# Patient Record
Sex: FEMALE | Race: WHITE | NOT HISPANIC OR LATINO | Employment: FULL TIME | RURAL
[De-identification: names, ages, dates, MRNs, and addresses within clinical notes are randomized per-mention and may not be internally consistent; named-entity substitution may affect disease eponyms.]

---

## 2017-02-22 ENCOUNTER — HISTORICAL (OUTPATIENT)
Dept: ADMINISTRATIVE | Facility: HOSPITAL | Age: 57
End: 2017-02-22

## 2017-02-23 LAB
LAB AP CLINICAL INFORMATION: NORMAL
LAB AP DIAGNOSIS - HISTORICAL: NORMAL
LAB AP GROSS PATHOLOGY - HISTORICAL: NORMAL
LAB AP SPECIMEN SUBMITTED - HISTORICAL: NORMAL

## 2019-09-26 ENCOUNTER — HISTORICAL (OUTPATIENT)
Dept: ADMINISTRATIVE | Facility: HOSPITAL | Age: 59
End: 2019-09-26

## 2020-08-13 ENCOUNTER — HISTORICAL (OUTPATIENT)
Dept: ADMINISTRATIVE | Facility: HOSPITAL | Age: 60
End: 2020-08-13

## 2021-04-20 DIAGNOSIS — Z86.010 HISTORY OF COLON POLYPS: Primary | ICD-10-CM

## 2021-05-17 ENCOUNTER — HOSPITAL ENCOUNTER (OUTPATIENT)
Dept: GASTROENTEROLOGY | Facility: HOSPITAL | Age: 61
Discharge: HOME | End: 2021-05-17
Attending: INTERNAL MEDICINE
Payer: COMMERCIAL

## 2021-05-17 ENCOUNTER — ANESTHESIA (OUTPATIENT)
Dept: GASTROENTEROLOGY | Facility: HOSPITAL | Age: 61
End: 2021-05-17
Payer: COMMERCIAL

## 2021-05-17 ENCOUNTER — ANESTHESIA EVENT (OUTPATIENT)
Dept: GASTROENTEROLOGY | Facility: HOSPITAL | Age: 61
End: 2021-05-17
Payer: COMMERCIAL

## 2021-05-17 VITALS
TEMPERATURE: 97 F | HEART RATE: 49 BPM | SYSTOLIC BLOOD PRESSURE: 125 MMHG | DIASTOLIC BLOOD PRESSURE: 58 MMHG | BODY MASS INDEX: 27.92 KG/M2 | OXYGEN SATURATION: 100 % | WEIGHT: 195 LBS | RESPIRATION RATE: 15 BRPM | HEIGHT: 70 IN

## 2021-05-17 DIAGNOSIS — K57.30 DIVERTICULA OF COLON: ICD-10-CM

## 2021-05-17 DIAGNOSIS — Z12.11 SCREENING FOR MALIGNANT NEOPLASM OF COLON: ICD-10-CM

## 2021-05-17 DIAGNOSIS — Z86.010 HISTORY OF COLON POLYPS: ICD-10-CM

## 2021-05-17 PROBLEM — Z86.0100 HISTORY OF COLON POLYPS: Status: ACTIVE | Noted: 2021-05-17

## 2021-05-17 PROCEDURE — 25000003 PHARM REV CODE 250: Performed by: NURSE ANESTHETIST, CERTIFIED REGISTERED

## 2021-05-17 PROCEDURE — 37000008 HC ANESTHESIA 1ST 15 MINUTES

## 2021-05-17 PROCEDURE — D9220A PRA ANESTHESIA: Mod: ,,, | Performed by: NURSE ANESTHETIST, CERTIFIED REGISTERED

## 2021-05-17 PROCEDURE — 37000009 HC ANESTHESIA EA ADD 15 MINS

## 2021-05-17 PROCEDURE — 27201423 OPTIME MED/SURG SUP & DEVICES STERILE SUPPLY

## 2021-05-17 PROCEDURE — D9220A PRA ANESTHESIA: ICD-10-PCS | Mod: ,,, | Performed by: NURSE ANESTHETIST, CERTIFIED REGISTERED

## 2021-05-17 PROCEDURE — G0105 COLORECTAL SCRN; HI RISK IND: HCPCS

## 2021-05-17 PROCEDURE — 63600175 PHARM REV CODE 636 W HCPCS: Performed by: NURSE ANESTHETIST, CERTIFIED REGISTERED

## 2021-05-17 RX ORDER — OMEPRAZOLE 20 MG/1
20 CAPSULE, DELAYED RELEASE ORAL DAILY
Status: ON HOLD | COMMUNITY
End: 2021-11-24 | Stop reason: HOSPADM

## 2021-05-17 RX ORDER — PROPOFOL 10 MG/ML
VIAL (ML) INTRAVENOUS
Status: DISCONTINUED | OUTPATIENT
Start: 2021-05-17 | End: 2021-05-17

## 2021-05-17 RX ORDER — SIMVASTATIN 40 MG/1
40 TABLET, FILM COATED ORAL NIGHTLY
Status: ON HOLD | COMMUNITY
End: 2021-11-24 | Stop reason: HOSPADM

## 2021-05-17 RX ORDER — SODIUM CHLORIDE 0.9 % (FLUSH) 0.9 %
10 SYRINGE (ML) INJECTION
Status: DISCONTINUED | OUTPATIENT
Start: 2021-05-17 | End: 2021-05-18 | Stop reason: HOSPADM

## 2021-05-17 RX ADMIN — SODIUM CHLORIDE: 9 INJECTION, SOLUTION INTRAVENOUS at 12:05

## 2021-05-17 RX ADMIN — PROPOFOL 50 MG: 10 INJECTION, EMULSION INTRAVENOUS at 12:05

## 2021-05-17 RX ADMIN — PROPOFOL 100 MG: 10 INJECTION, EMULSION INTRAVENOUS at 12:05

## 2021-08-17 ENCOUNTER — HOSPITAL ENCOUNTER (OUTPATIENT)
Dept: RADIOLOGY | Facility: HOSPITAL | Age: 61
Discharge: HOME | End: 2021-08-17
Attending: NURSE PRACTITIONER
Payer: COMMERCIAL

## 2021-08-17 VITALS — WEIGHT: 195 LBS | BODY MASS INDEX: 27.92 KG/M2 | HEIGHT: 70 IN

## 2021-08-17 DIAGNOSIS — Z12.31 VISIT FOR SCREENING MAMMOGRAM: ICD-10-CM

## 2021-08-17 PROCEDURE — 77067 SCR MAMMO BI INCL CAD: CPT | Mod: TC

## 2021-08-20 ENCOUNTER — HOSPITAL ENCOUNTER (OUTPATIENT)
Dept: RADIOLOGY | Facility: HOSPITAL | Age: 61
Discharge: HOME | End: 2021-08-20
Attending: NURSE PRACTITIONER
Payer: COMMERCIAL

## 2021-08-20 DIAGNOSIS — R79.89 ELEVATED LFTS: ICD-10-CM

## 2021-08-20 PROCEDURE — 76705 ECHO EXAM OF ABDOMEN: CPT | Mod: 26,,, | Performed by: RADIOLOGY

## 2021-08-20 PROCEDURE — 76705 US ABDOMEN LIMITED_LIVER: ICD-10-PCS | Mod: 26,,, | Performed by: RADIOLOGY

## 2021-08-20 PROCEDURE — 76705 ECHO EXAM OF ABDOMEN: CPT | Mod: TC

## 2021-08-26 ENCOUNTER — HOSPITAL ENCOUNTER (OUTPATIENT)
Dept: RADIOLOGY | Facility: HOSPITAL | Age: 61
Discharge: HOME | End: 2021-08-26
Attending: STUDENT IN AN ORGANIZED HEALTH CARE EDUCATION/TRAINING PROGRAM
Payer: COMMERCIAL

## 2021-08-26 DIAGNOSIS — R92.8 ABNORMAL FINDING ON BREAST IMAGING: ICD-10-CM

## 2021-08-26 PROCEDURE — 77065 DX MAMMO INCL CAD UNI: CPT | Mod: TC,LT

## 2021-11-19 ENCOUNTER — OFFICE VISIT (OUTPATIENT)
Dept: FAMILY MEDICINE | Facility: CLINIC | Age: 61
End: 2021-11-19
Payer: COMMERCIAL

## 2021-11-19 VITALS
DIASTOLIC BLOOD PRESSURE: 69 MMHG | TEMPERATURE: 98 F | BODY MASS INDEX: 28.92 KG/M2 | WEIGHT: 202 LBS | RESPIRATION RATE: 20 BRPM | HEART RATE: 78 BPM | OXYGEN SATURATION: 98 % | HEIGHT: 70 IN | SYSTOLIC BLOOD PRESSURE: 118 MMHG

## 2021-11-19 DIAGNOSIS — R10.84 GENERALIZED ABDOMINAL PAIN: Primary | ICD-10-CM

## 2021-11-19 DIAGNOSIS — R17 JAUNDICE: ICD-10-CM

## 2021-11-19 LAB
ALBUMIN SERPL BCP-MCNC: 1.8 G/DL (ref 3.5–5)
ALBUMIN/GLOB SERPL: 0.4 {RATIO}
ALP SERPL-CCNC: 267 U/L (ref 50–130)
ALT SERPL W P-5'-P-CCNC: 540 U/L (ref 13–56)
AMYLASE SERPL-CCNC: 25 U/L (ref 25–115)
ANION GAP SERPL CALCULATED.3IONS-SCNC: 8 MMOL/L (ref 7–16)
AST SERPL W P-5'-P-CCNC: 994 U/L (ref 15–37)
BASOPHILS # BLD AUTO: 0.04 K/UL (ref 0–0.2)
BASOPHILS NFR BLD AUTO: 0.6 % (ref 0–1)
BILIRUB SERPL-MCNC: 13.1 MG/DL (ref 0–1.2)
BILIRUB SERPL-MCNC: ABNORMAL MG/DL
BLOOD URINE, POC: ABNORMAL
BUN SERPL-MCNC: 6 MG/DL (ref 7–18)
BUN/CREAT SERPL: 8 (ref 6–20)
CALCIUM SERPL-MCNC: 8.3 MG/DL (ref 8.5–10.1)
CHLORIDE SERPL-SCNC: 106 MMOL/L (ref 98–107)
CO2 SERPL-SCNC: 28 MMOL/L (ref 21–32)
COLOR, POC UA: YELLOW
CREAT SERPL-MCNC: 0.77 MG/DL (ref 0.55–1.02)
DIFFERENTIAL METHOD BLD: ABNORMAL
EOSINOPHIL # BLD AUTO: 0.16 K/UL (ref 0–0.5)
EOSINOPHIL NFR BLD AUTO: 2.5 % (ref 1–4)
ERYTHROCYTE [DISTWIDTH] IN BLOOD BY AUTOMATED COUNT: 16.2 % (ref 11.5–14.5)
GLOBULIN SER-MCNC: 5 G/DL (ref 2–4)
GLUCOSE SERPL-MCNC: 81 MG/DL (ref 74–106)
GLUCOSE UR QL STRIP: 100
HAV IGM SER QL: NORMAL
HBV CORE IGM SER QL: NORMAL
HBV SURFACE AG SERPL QL IA: NORMAL
HCT VFR BLD AUTO: 33 % (ref 38–47)
HCV AB SER QL: NORMAL
HGB BLD-MCNC: 11.8 G/DL (ref 12–16)
IMM GRANULOCYTES # BLD AUTO: 0.01 K/UL (ref 0–0.04)
IMM GRANULOCYTES NFR BLD: 0.2 % (ref 0–0.4)
KETONES UR QL STRIP: NEGATIVE
LEUKOCYTE ESTERASE URINE, POC: ABNORMAL
LIPASE SERPL-CCNC: 87 U/L (ref 73–393)
LYMPHOCYTES # BLD AUTO: 1.93 K/UL (ref 1–4.8)
LYMPHOCYTES NFR BLD AUTO: 29.6 % (ref 27–41)
MCH RBC QN AUTO: 36.1 PG (ref 27–31)
MCHC RBC AUTO-ENTMCNC: 35.8 G/DL (ref 32–36)
MCV RBC AUTO: 100.9 FL (ref 80–96)
MONOCYTES # BLD AUTO: 0.82 K/UL (ref 0–0.8)
MONOCYTES NFR BLD AUTO: 12.6 % (ref 2–6)
MPC BLD CALC-MCNC: 11.4 FL (ref 9.4–12.4)
NEUTROPHILS # BLD AUTO: 3.55 K/UL (ref 1.8–7.7)
NEUTROPHILS NFR BLD AUTO: 54.5 % (ref 53–65)
NITRITE, POC UA: NEGATIVE
NRBC # BLD AUTO: 0 X10E3/UL
NRBC, AUTO (.00): 0 %
PH, POC UA: 6
PLATELET # BLD AUTO: 167 K/UL (ref 150–400)
POTASSIUM SERPL-SCNC: 3.8 MMOL/L (ref 3.5–5.1)
PROT SERPL-MCNC: 6.8 G/DL (ref 6.4–8.2)
PROTEIN, POC: 30
RBC # BLD AUTO: 3.27 M/UL (ref 4.2–5.4)
SODIUM SERPL-SCNC: 138 MMOL/L (ref 136–145)
SPECIFIC GRAVITY, POC UA: 1.03
UROBILINOGEN, POC UA: 0.2
WBC # BLD AUTO: 6.51 K/UL (ref 4.5–11)

## 2021-11-19 PROCEDURE — 85025 CBC WITH DIFFERENTIAL: ICD-10-PCS | Mod: ,,, | Performed by: CLINICAL MEDICAL LABORATORY

## 2021-11-19 PROCEDURE — 80053 COMPREHEN METABOLIC PANEL: CPT | Mod: ,,, | Performed by: CLINICAL MEDICAL LABORATORY

## 2021-11-19 PROCEDURE — 81003 URINALYSIS AUTO W/O SCOPE: CPT | Mod: QW,,, | Performed by: FAMILY MEDICINE

## 2021-11-19 PROCEDURE — 82150 ASSAY OF AMYLASE: CPT | Mod: ,,, | Performed by: CLINICAL MEDICAL LABORATORY

## 2021-11-19 PROCEDURE — 81003 POCT URINALYSIS W/O SCOPE: ICD-10-PCS | Mod: QW,,, | Performed by: FAMILY MEDICINE

## 2021-11-19 PROCEDURE — 99204 OFFICE O/P NEW MOD 45 MIN: CPT | Mod: ,,, | Performed by: FAMILY MEDICINE

## 2021-11-19 PROCEDURE — 80074 ACUTE HEPATITIS PANEL: CPT | Mod: ,,, | Performed by: CLINICAL MEDICAL LABORATORY

## 2021-11-19 PROCEDURE — 83690 ASSAY OF LIPASE: CPT | Mod: ,,, | Performed by: CLINICAL MEDICAL LABORATORY

## 2021-11-19 PROCEDURE — 80053 COMPREHENSIVE METABOLIC PANEL: ICD-10-PCS | Mod: ,,, | Performed by: CLINICAL MEDICAL LABORATORY

## 2021-11-19 PROCEDURE — 80074 HEPATITIS PANEL, ACUTE: ICD-10-PCS | Mod: ,,, | Performed by: CLINICAL MEDICAL LABORATORY

## 2021-11-19 PROCEDURE — 83690 LIPASE: ICD-10-PCS | Mod: ,,, | Performed by: CLINICAL MEDICAL LABORATORY

## 2021-11-19 PROCEDURE — 99204 PR OFFICE/OUTPT VISIT, NEW, LEVL IV, 45-59 MIN: ICD-10-PCS | Mod: ,,, | Performed by: FAMILY MEDICINE

## 2021-11-19 PROCEDURE — 85025 COMPLETE CBC W/AUTO DIFF WBC: CPT | Mod: ,,, | Performed by: CLINICAL MEDICAL LABORATORY

## 2021-11-19 PROCEDURE — 82150 AMYLASE: ICD-10-PCS | Mod: ,,, | Performed by: CLINICAL MEDICAL LABORATORY

## 2021-11-19 RX ORDER — LACTULOSE 10 G/15ML
30 SOLUTION ORAL 3 TIMES DAILY
Qty: 3000 ML | Refills: 3 | Status: SHIPPED | OUTPATIENT
Start: 2021-11-19 | End: 2021-11-27 | Stop reason: CLARIF

## 2021-11-20 DIAGNOSIS — R17 SERUM TOTAL BILIRUBIN ELEVATED: Primary | ICD-10-CM

## 2021-11-21 ENCOUNTER — TELEPHONE (OUTPATIENT)
Dept: FAMILY MEDICINE | Facility: CLINIC | Age: 61
End: 2021-11-21
Payer: COMMERCIAL

## 2021-11-22 ENCOUNTER — HOSPITAL ENCOUNTER (INPATIENT)
Facility: HOSPITAL | Age: 61
LOS: 2 days | Discharge: HOME | DRG: 443 | End: 2021-11-24
Attending: EMERGENCY MEDICINE | Admitting: HOSPITALIST
Payer: COMMERCIAL

## 2021-11-22 DIAGNOSIS — K57.30 DIVERTICULA OF COLON: ICD-10-CM

## 2021-11-22 DIAGNOSIS — R53.1 GENERALIZED WEAKNESS: ICD-10-CM

## 2021-11-22 DIAGNOSIS — K72.00 ACUTE LIVER FAILURE WITHOUT HEPATIC COMA: Primary | ICD-10-CM

## 2021-11-22 DIAGNOSIS — K76.9 PLEURAL EFFUSION ASSOCIATED WITH HEPATIC DISORDER: ICD-10-CM

## 2021-11-22 DIAGNOSIS — E87.6 HYPOKALEMIA: ICD-10-CM

## 2021-11-22 DIAGNOSIS — R79.1 ELEVATED INR: ICD-10-CM

## 2021-11-22 DIAGNOSIS — J91.8 PLEURAL EFFUSION ASSOCIATED WITH HEPATIC DISORDER: ICD-10-CM

## 2021-11-22 DIAGNOSIS — Z12.11 SCREENING FOR MALIGNANT NEOPLASM OF COLON: ICD-10-CM

## 2021-11-22 DIAGNOSIS — K75.9 HEPATITIS: ICD-10-CM

## 2021-11-22 DIAGNOSIS — R07.9 CHEST PAIN: ICD-10-CM

## 2021-11-22 DIAGNOSIS — Z86.010 HISTORY OF COLON POLYPS: ICD-10-CM

## 2021-11-22 DIAGNOSIS — F41.9 ANXIETY: Chronic | ICD-10-CM

## 2021-11-22 DIAGNOSIS — K21.9 GERD (GASTROESOPHAGEAL REFLUX DISEASE): ICD-10-CM

## 2021-11-22 DIAGNOSIS — K21.00 GASTRO-ESOPHAGEAL REFLUX DISEASE WITH ESOPHAGITIS: ICD-10-CM

## 2021-11-22 DIAGNOSIS — E78.5 HLD (HYPERLIPIDEMIA): ICD-10-CM

## 2021-11-22 LAB
ALBUMIN SERPL BCP-MCNC: 2 G/DL (ref 3.5–5)
ALBUMIN SERPL BCP-MCNC: 2 G/DL (ref 3.5–5)
ALBUMIN/GLOB SERPL: 0.4 {RATIO}
ALBUMIN/GLOB SERPL: 0.4 {RATIO}
ALP SERPL-CCNC: 259 U/L (ref 50–130)
ALP SERPL-CCNC: 273 U/L (ref 50–130)
ALT SERPL W P-5'-P-CCNC: 648 U/L (ref 13–56)
ALT SERPL W P-5'-P-CCNC: 675 U/L (ref 13–56)
AMMONIA PLAS-SCNC: <10 ΜMOL/L (ref 11–32)
ANION GAP SERPL CALCULATED.3IONS-SCNC: 12 MMOL/L (ref 7–16)
ANION GAP SERPL CALCULATED.3IONS-SCNC: 7 MMOL/L (ref 7–16)
APAP SERPL-MCNC: <2 ΜG/ML (ref 10–30)
APTT PPP: 45.1 SECONDS (ref 25.2–37.3)
AST SERPL W P-5'-P-CCNC: 1361 U/L (ref 15–37)
AST SERPL W P-5'-P-CCNC: 1390 U/L (ref 15–37)
BASOPHILS # BLD AUTO: 0.05 K/UL (ref 0–0.2)
BASOPHILS NFR BLD AUTO: 0.9 % (ref 0–1)
BILIRUB SERPL-MCNC: 15.7 MG/DL (ref 0–1.2)
BILIRUB SERPL-MCNC: 16 MG/DL (ref 0–1.2)
BILIRUB UR QL STRIP: ABNORMAL
BUN SERPL-MCNC: 6 MG/DL (ref 7–18)
BUN SERPL-MCNC: 6 MG/DL (ref 7–18)
BUN/CREAT SERPL: 7 (ref 6–20)
BUN/CREAT SERPL: 7 (ref 6–20)
CALCIUM SERPL-MCNC: 8.1 MG/DL (ref 8.5–10.1)
CALCIUM SERPL-MCNC: 8.2 MG/DL (ref 8.5–10.1)
CHLORIDE SERPL-SCNC: 103 MMOL/L (ref 98–107)
CHLORIDE SERPL-SCNC: 104 MMOL/L (ref 98–107)
CLARITY UR: CLEAR
CO2 SERPL-SCNC: 27 MMOL/L (ref 21–32)
CO2 SERPL-SCNC: 29 MMOL/L (ref 21–32)
COLOR UR: ABNORMAL
CREAT SERPL-MCNC: 0.83 MG/DL (ref 0.55–1.02)
CREAT SERPL-MCNC: 0.86 MG/DL (ref 0.55–1.02)
DIFFERENTIAL METHOD BLD: ABNORMAL
EOSINOPHIL # BLD AUTO: 0.04 K/UL (ref 0–0.5)
EOSINOPHIL NFR BLD AUTO: 0.7 % (ref 1–4)
ERYTHROCYTE [DISTWIDTH] IN BLOOD BY AUTOMATED COUNT: 15.6 % (ref 11.5–14.5)
GLOBULIN SER-MCNC: 5 G/DL (ref 2–4)
GLOBULIN SER-MCNC: 5 G/DL (ref 2–4)
GLUCOSE SERPL-MCNC: 71 MG/DL (ref 74–106)
GLUCOSE SERPL-MCNC: 72 MG/DL (ref 74–106)
GLUCOSE UR STRIP-MCNC: 100 MG/DL
HCT VFR BLD AUTO: 34.3 % (ref 38–47)
HGB BLD-MCNC: 12.2 G/DL (ref 12–16)
IMM GRANULOCYTES # BLD AUTO: 0.02 K/UL (ref 0–0.04)
IMM GRANULOCYTES NFR BLD: 0.3 % (ref 0–0.4)
INR BLD: 1.97 (ref 0.9–1.1)
KETONES UR STRIP-SCNC: ABNORMAL MG/DL
LEUKOCYTE ESTERASE UR QL STRIP: NEGATIVE
LIPASE SERPL-CCNC: 114 U/L (ref 73–393)
LYMPHOCYTES # BLD AUTO: 0.91 K/UL (ref 1–4.8)
LYMPHOCYTES NFR BLD AUTO: 15.9 % (ref 27–41)
MCH RBC QN AUTO: 35.3 PG (ref 27–31)
MCHC RBC AUTO-ENTMCNC: 35.6 G/DL (ref 32–36)
MCV RBC AUTO: 99.1 FL (ref 80–96)
MONOCYTES # BLD AUTO: 0.62 K/UL (ref 0–0.8)
MONOCYTES NFR BLD AUTO: 10.8 % (ref 2–6)
MPC BLD CALC-MCNC: 10.9 FL (ref 9.4–12.4)
NEUTROPHILS # BLD AUTO: 4.08 K/UL (ref 1.8–7.7)
NEUTROPHILS NFR BLD AUTO: 71.4 % (ref 53–65)
NITRITE UR QL STRIP: NEGATIVE
NRBC # BLD AUTO: 0 X10E3/UL
NRBC, AUTO (.00): 0 %
PH UR STRIP: 7 PH UNITS
PLATELET # BLD AUTO: 156 K/UL (ref 150–400)
POTASSIUM SERPL-SCNC: 3.3 MMOL/L (ref 3.5–5.1)
POTASSIUM SERPL-SCNC: 3.4 MMOL/L (ref 3.5–5.1)
PROT SERPL-MCNC: 7 G/DL (ref 6.4–8.2)
PROT SERPL-MCNC: 7 G/DL (ref 6.4–8.2)
PROT UR QL STRIP: NEGATIVE
PROTHROMBIN TIME: 22.1 SECONDS (ref 11.7–14.7)
RBC # BLD AUTO: 3.46 M/UL (ref 4.2–5.4)
RBC # UR STRIP: NEGATIVE /UL
SODIUM SERPL-SCNC: 137 MMOL/L (ref 136–145)
SODIUM SERPL-SCNC: 139 MMOL/L (ref 136–145)
SP GR UR STRIP: <=1.005
UROBILINOGEN UR STRIP-ACNC: 0.2 MG/DL
WBC # BLD AUTO: 5.72 K/UL (ref 4.5–11)

## 2021-11-22 PROCEDURE — 85730 THROMBOPLASTIN TIME PARTIAL: CPT | Performed by: EMERGENCY MEDICINE

## 2021-11-22 PROCEDURE — 36415 COLL VENOUS BLD VENIPUNCTURE: CPT | Performed by: EMERGENCY MEDICINE

## 2021-11-22 PROCEDURE — 99285 EMERGENCY DEPT VISIT HI MDM: CPT | Mod: ,,, | Performed by: EMERGENCY MEDICINE

## 2021-11-22 PROCEDURE — 83690 ASSAY OF LIPASE: CPT | Performed by: EMERGENCY MEDICINE

## 2021-11-22 PROCEDURE — 84165 PROTEIN ELECTROPHORESIS, SERUM WITH REFLEX IFE: ICD-10-PCS | Mod: 26,,, | Performed by: PATHOLOGY

## 2021-11-22 PROCEDURE — 80053 COMPREHEN METABOLIC PANEL: CPT | Performed by: EMERGENCY MEDICINE

## 2021-11-22 PROCEDURE — A9575 INJ GADOTERATE MEGLUMI 0.1ML: HCPCS | Performed by: HOSPITALIST

## 2021-11-22 PROCEDURE — 25000003 PHARM REV CODE 250: Performed by: INTERNAL MEDICINE

## 2021-11-22 PROCEDURE — 84165 PROTEIN E-PHORESIS SERUM: CPT | Performed by: INTERNAL MEDICINE

## 2021-11-22 PROCEDURE — 80053 COMPREHEN METABOLIC PANEL: CPT | Performed by: INTERNAL MEDICINE

## 2021-11-22 PROCEDURE — 99223 PR INITIAL HOSPITAL CARE,LEVL III: ICD-10-PCS | Mod: ,,, | Performed by: HOSPITALIST

## 2021-11-22 PROCEDURE — 99285 EMERGENCY DEPT VISIT HI MDM: CPT | Mod: 25

## 2021-11-22 PROCEDURE — 82140 ASSAY OF AMMONIA: CPT | Performed by: EMERGENCY MEDICINE

## 2021-11-22 PROCEDURE — 93005 ELECTROCARDIOGRAM TRACING: CPT

## 2021-11-22 PROCEDURE — 85025 COMPLETE CBC W/AUTO DIFF WBC: CPT | Performed by: EMERGENCY MEDICINE

## 2021-11-22 PROCEDURE — 81003 URINALYSIS AUTO W/O SCOPE: CPT

## 2021-11-22 PROCEDURE — 25500020 PHARM REV CODE 255: Performed by: EMERGENCY MEDICINE

## 2021-11-22 PROCEDURE — 99285 PR EMERGENCY DEPT VISIT,LEVEL V: ICD-10-PCS | Mod: ,,, | Performed by: EMERGENCY MEDICINE

## 2021-11-22 PROCEDURE — 86664 EPSTEIN-BARR NUCLEAR ANTIGEN: CPT | Performed by: INTERNAL MEDICINE

## 2021-11-22 PROCEDURE — 84165 PROTEIN E-PHORESIS SERUM: CPT | Mod: 26,,, | Performed by: PATHOLOGY

## 2021-11-22 PROCEDURE — 99223 1ST HOSP IP/OBS HIGH 75: CPT | Mod: ,,, | Performed by: HOSPITALIST

## 2021-11-22 PROCEDURE — 80143 DRUG ASSAY ACETAMINOPHEN: CPT | Performed by: INTERNAL MEDICINE

## 2021-11-22 PROCEDURE — 96374 THER/PROPH/DIAG INJ IV PUSH: CPT

## 2021-11-22 PROCEDURE — 93010 EKG 12-LEAD: ICD-10-PCS | Mod: ,,, | Performed by: INTERNAL MEDICINE

## 2021-11-22 PROCEDURE — 93010 ELECTROCARDIOGRAM REPORT: CPT | Mod: ,,, | Performed by: INTERNAL MEDICINE

## 2021-11-22 PROCEDURE — 85610 PROTHROMBIN TIME: CPT | Performed by: EMERGENCY MEDICINE

## 2021-11-22 PROCEDURE — 25500020 PHARM REV CODE 255: Performed by: HOSPITALIST

## 2021-11-22 PROCEDURE — 86645 CMV ANTIBODY IGM: CPT | Mod: 90 | Performed by: INTERNAL MEDICINE

## 2021-11-22 PROCEDURE — 11000001 HC ACUTE MED/SURG PRIVATE ROOM

## 2021-11-22 RX ORDER — ATORVASTATIN CALCIUM 10 MG/1
10 TABLET, FILM COATED ORAL DAILY
Status: DISCONTINUED | OUTPATIENT
Start: 2021-11-22 | End: 2021-11-22

## 2021-11-22 RX ORDER — GADOTERATE MEGLUMINE 376.9 MG/ML
18 INJECTION INTRAVENOUS
Status: COMPLETED | OUTPATIENT
Start: 2021-11-22 | End: 2021-11-22

## 2021-11-22 RX ORDER — GLUCAGON 1 MG
1 KIT INJECTION
Status: DISCONTINUED | OUTPATIENT
Start: 2021-11-22 | End: 2021-11-24 | Stop reason: HOSPADM

## 2021-11-22 RX ORDER — PHYTONADIONE 1 MG/.5ML
1 INJECTION, EMULSION INTRAMUSCULAR; INTRAVENOUS; SUBCUTANEOUS ONCE
Status: DISCONTINUED | OUTPATIENT
Start: 2021-11-22 | End: 2021-11-24 | Stop reason: HOSPADM

## 2021-11-22 RX ORDER — LACTULOSE 10 G/15ML
30 SOLUTION ORAL 3 TIMES DAILY
Status: DISCONTINUED | OUTPATIENT
Start: 2021-11-22 | End: 2021-11-22

## 2021-11-22 RX ORDER — LACTULOSE 10 G/15ML
15 SOLUTION ORAL DAILY
Status: DISCONTINUED | OUTPATIENT
Start: 2021-11-22 | End: 2021-11-24 | Stop reason: HOSPADM

## 2021-11-22 RX ORDER — PANTOPRAZOLE SODIUM 40 MG/1
40 TABLET, DELAYED RELEASE ORAL DAILY
Status: DISCONTINUED | OUTPATIENT
Start: 2021-11-23 | End: 2021-11-24 | Stop reason: HOSPADM

## 2021-11-22 RX ORDER — SODIUM CHLORIDE 0.9 % (FLUSH) 0.9 %
10 SYRINGE (ML) INJECTION EVERY 12 HOURS PRN
Status: DISCONTINUED | OUTPATIENT
Start: 2021-11-22 | End: 2021-11-24 | Stop reason: HOSPADM

## 2021-11-22 RX ADMIN — IOPAMIDOL 100 ML: 755 INJECTION, SOLUTION INTRAVENOUS at 10:11

## 2021-11-22 RX ADMIN — GADOTERATE MEGLUMINE 18 ML: 376.9 INJECTION INTRAVENOUS at 04:11

## 2021-11-22 RX ADMIN — LACTULOSE 15 G: 20 SOLUTION ORAL at 04:11

## 2021-11-23 LAB
A1AT SERPL NEPH-MCNC: 122 MG/DL (ref 90–200)
ALBUMIN PE, BLOOD: 3.02 G/DL (ref 3.5–5.2)
ALBUMIN SERPL BCP-MCNC: 1.5 G/DL (ref 3.5–5)
ALBUMIN/GLOB SERPL: 0.4 {RATIO}
ALBUMIN/GLOB SERPL: 0.4 {RATIO}
ALP SERPL-CCNC: 221 U/L (ref 50–130)
ALPHA1 GLOB MFR CSF ELPH: >4 % (ref 0–0.9)
ALPHA1 GLOB SERPL ELPH-MCNC: 0.2 G/DL (ref 0.1–0.4)
ALPHA2 GLOB MFR CSF ELPH: 2.92 % (ref 0–0.9)
ALPHA2 GLOB SERPL ELPH-MCNC: 0.3 G/DL (ref 0.4–1.3)
ALT SERPL W P-5'-P-CCNC: 572 U/L (ref 13–56)
AMMONIA PLAS-SCNC: <10 ΜMOL/L (ref 11–32)
ANA SER QL: NEGATIVE
ANION GAP SERPL CALCULATED.3IONS-SCNC: 9 MMOL/L (ref 7–16)
ANISOCYTOSIS BLD QL SMEAR: ABNORMAL
AST SERPL W P-5'-P-CCNC: 1263 U/L (ref 15–37)
B-GLOBULIN SERPL ELPH-MCNC: 0.4 G/DL (ref 0.5–1.5)
BASOPHILS # BLD AUTO: 0.06 K/UL (ref 0–0.2)
BASOPHILS NFR BLD AUTO: 1 % (ref 0–1)
BILIRUB SERPL-MCNC: 13.2 MG/DL (ref 0–1.2)
BUN SERPL-MCNC: 7 MG/DL (ref 7–18)
BUN/CREAT SERPL: 9 (ref 6–20)
CALCIUM SERPL-MCNC: 7.4 MG/DL (ref 8.5–10.1)
CERULOPLASMIN SERPL-MCNC: 22.6 MG/DL
CHLORIDE SERPL-SCNC: 105 MMOL/L (ref 98–107)
CO2 SERPL-SCNC: 26 MMOL/L (ref 21–32)
CREAT SERPL-MCNC: 0.74 MG/DL (ref 0.55–1.02)
CRP SERPL-MCNC: 2.9 MG/DL (ref 0–0.8)
DIFFERENTIAL METHOD BLD: ABNORMAL
EBV NA IGG SER QL IA: POSITIVE
EBV VCA IGG SER QL IA: POSITIVE
EBV VCA IGM SER QL IA: POSITIVE
EOSINOPHIL # BLD AUTO: 0.21 K/UL (ref 0–0.5)
EOSINOPHIL NFR BLD AUTO: 3.7 % (ref 1–4)
ERYTHROCYTE [DISTWIDTH] IN BLOOD BY AUTOMATED COUNT: 15.1 % (ref 11.5–14.5)
ETHANOL SERPL-MCNC: >3 MG/DL (ref 0–0.9)
FERRITIN SERPL-MCNC: 2054 NG/ML (ref 8–252)
GAMMA GLOB SERPL ELPH-MCNC: 2.5 G/DL (ref 0.5–1.8)
GLOBULIN SER-MCNC: 4.2 G/DL (ref 2–4)
GLUCOSE SERPL-MCNC: 78 MG/DL (ref 74–106)
HCT VFR BLD AUTO: 27.8 % (ref 38–47)
HGB BLD-MCNC: 10.3 G/DL (ref 12–16)
HSV IGM SER QL IA: POSITIVE
IMM GRANULOCYTES # BLD AUTO: 0.02 K/UL (ref 0–0.04)
IMM GRANULOCYTES NFR BLD: 0.3 % (ref 0–0.4)
IRON SATN MFR SERPL: 67 % (ref 14–50)
IRON SERPL-MCNC: 168 ΜG/DL (ref 50–170)
LYMPHOCYTES # BLD AUTO: 1.73 K/UL (ref 1–4.8)
LYMPHOCYTES NFR BLD AUTO: 30.1 % (ref 27–41)
MCH RBC QN AUTO: 35.4 PG (ref 27–31)
MCHC RBC AUTO-ENTMCNC: 37.1 G/DL (ref 32–36)
MCV RBC AUTO: 95.5 FL (ref 80–96)
MONOCYTES # BLD AUTO: 0.59 K/UL (ref 0–0.8)
MONOCYTES NFR BLD AUTO: 10.3 % (ref 2–6)
MPC BLD CALC-MCNC: 11.7 FL (ref 9.4–12.4)
NEUTROPHILS # BLD AUTO: 3.14 K/UL (ref 1.8–7.7)
NEUTROPHILS NFR BLD AUTO: 54.6 % (ref 53–65)
NRBC # BLD AUTO: 0 X10E3/UL
NRBC, AUTO (.00): 0 %
OVALOCYTES BLD QL SMEAR: ABNORMAL
PATH INTERP BLD-IMP: ABNORMAL
PLATELET # BLD AUTO: 85 K/UL (ref 150–400)
PLATELET MORPHOLOGY: ABNORMAL
POLYCHROMASIA BLD QL SMEAR: ABNORMAL
POTASSIUM SERPL-SCNC: 4.1 MMOL/L (ref 3.5–5.1)
PROT SERPL-MCNC: 5.7 G/DL (ref 6.4–8.2)
PROT SERPL-MCNC: 6.5 G/DL (ref 6.4–8.2)
RBC # BLD AUTO: 2.91 M/UL (ref 4.2–5.4)
SODIUM SERPL-SCNC: 136 MMOL/L (ref 136–145)
T4 SERPL-MCNC: 9.8 ΜG/DL (ref 4.8–13.9)
TARGETS BLD QL SMEAR: ABNORMAL
TIBC SERPL-MCNC: 250 ΜG/DL (ref 250–450)
TSH SERPL DL<=0.005 MIU/L-ACNC: 1.54 UIU/ML (ref 0.36–3.74)
WBC # BLD AUTO: 5.75 K/UL (ref 4.5–11)

## 2021-11-23 PROCEDURE — 86140 C-REACTIVE PROTEIN: CPT | Performed by: HOSPITALIST

## 2021-11-23 PROCEDURE — 84443 ASSAY THYROID STIM HORMONE: CPT | Performed by: HOSPITALIST

## 2021-11-23 PROCEDURE — 83550 IRON BINDING TEST: CPT | Performed by: INTERNAL MEDICINE

## 2021-11-23 PROCEDURE — 36415 COLL VENOUS BLD VENIPUNCTURE: CPT | Performed by: INTERNAL MEDICINE

## 2021-11-23 PROCEDURE — 99232 SBSQ HOSP IP/OBS MODERATE 35: CPT | Mod: ,,, | Performed by: HOSPITALIST

## 2021-11-23 PROCEDURE — 82140 ASSAY OF AMMONIA: CPT | Performed by: INTERNAL MEDICINE

## 2021-11-23 PROCEDURE — 86694 HERPES SIMPLEX NES ANTBDY: CPT | Performed by: INTERNAL MEDICINE

## 2021-11-23 PROCEDURE — 82390 ASSAY OF CERULOPLASMIN: CPT | Performed by: INTERNAL MEDICINE

## 2021-11-23 PROCEDURE — 81256 HFE GENE: CPT | Mod: 90 | Performed by: INTERNAL MEDICINE

## 2021-11-23 PROCEDURE — 86038 ANTINUCLEAR ANTIBODIES: CPT | Performed by: INTERNAL MEDICINE

## 2021-11-23 PROCEDURE — 25000003 PHARM REV CODE 250: Performed by: NURSE PRACTITIONER

## 2021-11-23 PROCEDURE — 84436 ASSAY OF TOTAL THYROXINE: CPT | Performed by: HOSPITALIST

## 2021-11-23 PROCEDURE — 99232 PR SUBSEQUENT HOSPITAL CARE,LEVL II: ICD-10-PCS | Mod: ,,, | Performed by: HOSPITALIST

## 2021-11-23 PROCEDURE — 80053 COMPREHEN METABOLIC PANEL: CPT | Performed by: NURSE PRACTITIONER

## 2021-11-23 PROCEDURE — 85025 COMPLETE CBC W/AUTO DIFF WBC: CPT | Performed by: NURSE PRACTITIONER

## 2021-11-23 PROCEDURE — 25000003 PHARM REV CODE 250: Performed by: INTERNAL MEDICINE

## 2021-11-23 PROCEDURE — 63600175 PHARM REV CODE 636 W HCPCS: Performed by: NURSE PRACTITIONER

## 2021-11-23 PROCEDURE — 11000001 HC ACUTE MED/SURG PRIVATE ROOM

## 2021-11-23 PROCEDURE — 82728 ASSAY OF FERRITIN: CPT | Performed by: INTERNAL MEDICINE

## 2021-11-23 PROCEDURE — 83540 ASSAY OF IRON: CPT | Performed by: INTERNAL MEDICINE

## 2021-11-23 PROCEDURE — 82103 ALPHA-1-ANTITRYPSIN TOTAL: CPT | Performed by: INTERNAL MEDICINE

## 2021-11-23 RX ORDER — SODIUM CHLORIDE, SODIUM LACTATE, POTASSIUM CHLORIDE, CALCIUM CHLORIDE 600; 310; 30; 20 MG/100ML; MG/100ML; MG/100ML; MG/100ML
INJECTION, SOLUTION INTRAVENOUS CONTINUOUS
Status: DISCONTINUED | OUTPATIENT
Start: 2021-11-23 | End: 2021-11-24 | Stop reason: HOSPADM

## 2021-11-23 RX ORDER — FERROUS SULFATE, DRIED 160(50) MG
1 TABLET, EXTENDED RELEASE ORAL DAILY
Status: DISCONTINUED | OUTPATIENT
Start: 2021-11-23 | End: 2021-11-24 | Stop reason: HOSPADM

## 2021-11-23 RX ADMIN — SODIUM CHLORIDE, POTASSIUM CHLORIDE, SODIUM LACTATE AND CALCIUM CHLORIDE: 600; 310; 30; 20 INJECTION, SOLUTION INTRAVENOUS at 01:11

## 2021-11-23 RX ADMIN — CALCIUM CARBONATE-VITAMIN D TAB 500 MG-200 UNIT 1 TABLET: 500-200 TAB at 01:11

## 2021-11-23 RX ADMIN — PANTOPRAZOLE SODIUM 40 MG: 40 TABLET, DELAYED RELEASE ORAL at 09:11

## 2021-11-24 VITALS
HEART RATE: 82 BPM | RESPIRATION RATE: 20 BRPM | OXYGEN SATURATION: 99 % | WEIGHT: 203 LBS | BODY MASS INDEX: 29.06 KG/M2 | TEMPERATURE: 98 F | HEIGHT: 70 IN | SYSTOLIC BLOOD PRESSURE: 118 MMHG | DIASTOLIC BLOOD PRESSURE: 65 MMHG

## 2021-11-24 LAB
ALBUMIN SERPL BCP-MCNC: 1.8 G/DL (ref 3.5–5)
ALBUMIN/GLOB SERPL: 0.4 {RATIO}
ALP SERPL-CCNC: 267 U/L (ref 50–130)
ALT SERPL W P-5'-P-CCNC: 707 U/L (ref 13–56)
ANION GAP SERPL CALCULATED.3IONS-SCNC: 10 MMOL/L (ref 7–16)
AORTIC VALVE CUSP SEPERATION: 1.94 CM
AST SERPL W P-5'-P-CCNC: 1518 U/L (ref 15–37)
AV INDEX (PROSTH): 0.7
AV MEAN GRADIENT: 11 MMHG
AV VALVE AREA: 2.25 CM2
BASOPHILS # BLD AUTO: 0.03 K/UL (ref 0–0.2)
BASOPHILS NFR BLD AUTO: 0.5 % (ref 0–1)
BILIRUB SERPL-MCNC: 15.1 MG/DL (ref 0–1.2)
BSA FOR ECHO PROCEDURE: 2.13 M2
BUN SERPL-MCNC: 6 MG/DL (ref 7–18)
BUN/CREAT SERPL: 7 (ref 6–20)
CALCIUM SERPL-MCNC: 7.8 MG/DL (ref 8.5–10.1)
CHLORIDE SERPL-SCNC: 103 MMOL/L (ref 98–107)
CMV IGG SERPL QL IA: POSITIVE
CMV IGM SERPL QL IA: NEGATIVE
CO2 SERPL-SCNC: 26 MMOL/L (ref 21–32)
CREAT SERPL-MCNC: 0.85 MG/DL (ref 0.55–1.02)
CV ECHO LV RWT: 0.43 CM
DIFFERENTIAL METHOD BLD: ABNORMAL
DOP CALC AO VTI: 46.15 CM
DOP CALC LVOT AREA: 3.2 CM2
DOP CALC LVOT DIAMETER: 2.02 CM
DOP CALC LVOT STROKE VOLUME: 103.68 CM3
DOP CALCLVOT PEAK VEL VTI: 32.37 CM
E WAVE DECELERATION TIME: 171 MSEC
E/A RATIO: 0.87
E/E' RATIO: 7.55 M/S
ECHO EF ESTIMATED: 87 %
ECHO LV POSTERIOR WALL: 1.02 CM (ref 0.6–1.1)
EJECTION FRACTION: 60 %
EOSINOPHIL # BLD AUTO: 0.07 K/UL (ref 0–0.5)
EOSINOPHIL NFR BLD AUTO: 1.1 % (ref 1–4)
ERYTHROCYTE [DISTWIDTH] IN BLOOD BY AUTOMATED COUNT: 15.5 % (ref 11.5–14.5)
FRACTIONAL SHORTENING: 70 % (ref 28–44)
GLOBULIN SER-MCNC: 5 G/DL (ref 2–4)
GLUCOSE SERPL-MCNC: 101 MG/DL (ref 74–106)
HCT VFR BLD AUTO: 31.5 % (ref 38–47)
HGB BLD-MCNC: 11.7 G/DL (ref 12–16)
HIV 1+O+2 AB SERPL QL: NORMAL
IMM GRANULOCYTES # BLD AUTO: 0.02 K/UL (ref 0–0.04)
IMM GRANULOCYTES NFR BLD: 0.3 % (ref 0–0.4)
INTERVENTRICULAR SEPTUM: 1.01 CM (ref 0.6–1.1)
LEFT ATRIUM SIZE: 4.51 CM
LEFT INTERNAL DIMENSION IN SYSTOLE: 1.41 CM (ref 2.1–4)
LEFT VENTRICLE DIASTOLIC VOLUME INDEX: 30.9 ML/M2
LEFT VENTRICLE DIASTOLIC VOLUME: 64.9 ML
LEFT VENTRICLE MASS INDEX: 81 G/M2
LEFT VENTRICLE SYSTOLIC VOLUME INDEX: 8 ML/M2
LEFT VENTRICLE SYSTOLIC VOLUME: 16.9 ML
LEFT VENTRICULAR INTERNAL DIMENSION IN DIASTOLE: 4.74 CM (ref 3.5–6)
LEFT VENTRICULAR MASS: 170.16 G
LV LATERAL E/E' RATIO: 6.92 M/S
LV SEPTAL E/E' RATIO: 8.3 M/S
LVOT MG: 5 MMHG
LYMPHOCYTES # BLD AUTO: 1.14 K/UL (ref 1–4.8)
LYMPHOCYTES NFR BLD AUTO: 18.7 % (ref 27–41)
MCH RBC QN AUTO: 35.5 PG (ref 27–31)
MCHC RBC AUTO-ENTMCNC: 37.1 G/DL (ref 32–36)
MCV RBC AUTO: 95.5 FL (ref 80–96)
MONOCYTES # BLD AUTO: 0.58 K/UL (ref 0–0.8)
MONOCYTES NFR BLD AUTO: 9.5 % (ref 2–6)
MPC BLD CALC-MCNC: 10.9 FL (ref 9.4–12.4)
MV PEAK A VEL: 0.95 M/S
MV PEAK E VEL: 0.83 M/S
NEUTROPHILS # BLD AUTO: 4.27 K/UL (ref 1.8–7.7)
NEUTROPHILS NFR BLD AUTO: 69.9 % (ref 53–65)
NRBC # BLD AUTO: 0 X10E3/UL
NRBC, AUTO (.00): 0 %
PLATELET # BLD AUTO: 117 K/UL (ref 150–400)
POTASSIUM SERPL-SCNC: 3.2 MMOL/L (ref 3.5–5.1)
PROT SERPL-MCNC: 6.8 G/DL (ref 6.4–8.2)
RA WIDTH: 3.29 CM
RBC # BLD AUTO: 3.3 M/UL (ref 4.2–5.4)
RIGHT VENTRICULAR END-DIASTOLIC DIMENSION: 2.52 CM
RIGHT VENTRICULAR LENGTH IN DIASTOLE (APICAL 4-CHAMBER VIEW): 5.25 CM
RV MID DIAMA: 1.87 CM
SODIUM SERPL-SCNC: 136 MMOL/L (ref 136–145)
TDI LATERAL: 0.12 M/S
TDI SEPTAL: 0.1 M/S
TDI: 0.11 M/S
TRICUSPID ANNULAR PLANE SYSTOLIC EXCURSION: 2 CM
WBC # BLD AUTO: 6.11 K/UL (ref 4.5–11)

## 2021-11-24 PROCEDURE — 63600175 PHARM REV CODE 636 W HCPCS: Performed by: NURSE PRACTITIONER

## 2021-11-24 PROCEDURE — 36415 COLL VENOUS BLD VENIPUNCTURE: CPT | Performed by: NURSE PRACTITIONER

## 2021-11-24 PROCEDURE — 25000003 PHARM REV CODE 250: Performed by: NURSE PRACTITIONER

## 2021-11-24 PROCEDURE — 85025 COMPLETE CBC W/AUTO DIFF WBC: CPT | Performed by: NURSE PRACTITIONER

## 2021-11-24 PROCEDURE — 80053 COMPREHEN METABOLIC PANEL: CPT | Performed by: NURSE PRACTITIONER

## 2021-11-24 PROCEDURE — 99239 PR HOSPITAL DISCHARGE DAY,>30 MIN: ICD-10-PCS | Mod: ,,, | Performed by: HOSPITALIST

## 2021-11-24 PROCEDURE — 99239 HOSP IP/OBS DSCHRG MGMT >30: CPT | Mod: ,,, | Performed by: HOSPITALIST

## 2021-11-24 PROCEDURE — 87389 HIV-1 AG W/HIV-1&-2 AB AG IA: CPT | Performed by: NURSE PRACTITIONER

## 2021-11-24 RX ORDER — POTASSIUM CHLORIDE 20 MEQ/1
40 TABLET, EXTENDED RELEASE ORAL ONCE
Status: DISCONTINUED | OUTPATIENT
Start: 2021-11-24 | End: 2021-11-24 | Stop reason: HOSPADM

## 2021-11-24 RX ORDER — PANTOPRAZOLE SODIUM 40 MG/1
40 TABLET, DELAYED RELEASE ORAL DAILY
Qty: 30 TABLET | Refills: 3 | Status: SHIPPED | OUTPATIENT
Start: 2021-11-25 | End: 2022-05-05

## 2021-11-24 RX ORDER — FERROUS SULFATE, DRIED 160(50) MG
1 TABLET, EXTENDED RELEASE ORAL DAILY
Qty: 30 TABLET | Refills: 3 | Status: SHIPPED | OUTPATIENT
Start: 2021-11-25 | End: 2022-11-25

## 2021-11-24 RX ADMIN — PANTOPRAZOLE SODIUM 40 MG: 40 TABLET, DELAYED RELEASE ORAL at 09:11

## 2021-11-24 RX ADMIN — SODIUM CHLORIDE, POTASSIUM CHLORIDE, SODIUM LACTATE AND CALCIUM CHLORIDE: 600; 310; 30; 20 INJECTION, SOLUTION INTRAVENOUS at 05:11

## 2021-11-24 RX ADMIN — CALCIUM CARBONATE-VITAMIN D TAB 500 MG-200 UNIT 1 TABLET: 500-200 TAB at 09:11

## 2021-11-27 ENCOUNTER — HOSPITAL ENCOUNTER (EMERGENCY)
Facility: HOSPITAL | Age: 61
Discharge: HOME | End: 2021-11-27
Attending: EMERGENCY MEDICINE
Payer: COMMERCIAL

## 2021-11-27 VITALS
OXYGEN SATURATION: 98 % | HEIGHT: 70 IN | BODY MASS INDEX: 28.92 KG/M2 | SYSTOLIC BLOOD PRESSURE: 125 MMHG | DIASTOLIC BLOOD PRESSURE: 71 MMHG | HEART RATE: 89 BPM | WEIGHT: 202 LBS | RESPIRATION RATE: 17 BRPM | TEMPERATURE: 98 F

## 2021-11-27 DIAGNOSIS — K62.5 RECTAL BLEEDING: Primary | ICD-10-CM

## 2021-11-27 DIAGNOSIS — K72.00 ACUTE LIVER FAILURE WITHOUT HEPATIC COMA: ICD-10-CM

## 2021-11-27 DIAGNOSIS — K21.9 GERD (GASTROESOPHAGEAL REFLUX DISEASE): ICD-10-CM

## 2021-11-27 DIAGNOSIS — K21.00 GASTRO-ESOPHAGEAL REFLUX DISEASE WITH ESOPHAGITIS: ICD-10-CM

## 2021-11-27 DIAGNOSIS — Z12.11 SCREENING FOR MALIGNANT NEOPLASM OF COLON: ICD-10-CM

## 2021-11-27 DIAGNOSIS — K75.9 HEPATITIS: ICD-10-CM

## 2021-11-27 DIAGNOSIS — R79.1 ELEVATED INR: ICD-10-CM

## 2021-11-27 DIAGNOSIS — Z86.010 HISTORY OF COLON POLYPS: ICD-10-CM

## 2021-11-27 DIAGNOSIS — E87.6 HYPOKALEMIA: ICD-10-CM

## 2021-11-27 DIAGNOSIS — K57.30 DIVERTICULA OF COLON: ICD-10-CM

## 2021-11-27 DIAGNOSIS — R53.1 GENERALIZED WEAKNESS: ICD-10-CM

## 2021-11-27 DIAGNOSIS — E78.5 HLD (HYPERLIPIDEMIA): ICD-10-CM

## 2021-11-27 LAB
ALBUMIN SERPL BCP-MCNC: 1.6 G/DL (ref 3.5–5)
ALBUMIN/GLOB SERPL: 0.3 {RATIO}
ALP SERPL-CCNC: 236 U/L (ref 50–130)
ALT SERPL W P-5'-P-CCNC: 812 U/L (ref 13–56)
ANION GAP SERPL CALCULATED.3IONS-SCNC: 12 MMOL/L (ref 7–16)
APTT PPP: 42.6 SECONDS (ref 25.2–37.3)
AST SERPL W P-5'-P-CCNC: 1266 U/L (ref 15–37)
BASOPHILS # BLD AUTO: 0.05 K/UL (ref 0–0.2)
BASOPHILS NFR BLD AUTO: 0.6 % (ref 0–1)
BILIRUB SERPL-MCNC: 16.8 MG/DL (ref 0–1.2)
BUN SERPL-MCNC: 10 MG/DL (ref 7–18)
BUN/CREAT SERPL: 11 (ref 6–20)
CALCIUM SERPL-MCNC: 7.8 MG/DL (ref 8.5–10.1)
CHLORIDE SERPL-SCNC: 102 MMOL/L (ref 98–107)
CO2 SERPL-SCNC: 24 MMOL/L (ref 21–32)
CREAT SERPL-MCNC: 0.91 MG/DL (ref 0.55–1.02)
DIFFERENTIAL METHOD BLD: ABNORMAL
EOSINOPHIL # BLD AUTO: 0.19 K/UL (ref 0–0.5)
EOSINOPHIL NFR BLD AUTO: 2.3 % (ref 1–4)
ERYTHROCYTE [DISTWIDTH] IN BLOOD BY AUTOMATED COUNT: 15.7 % (ref 11.5–14.5)
GLOBULIN SER-MCNC: 4.9 G/DL (ref 2–4)
GLUCOSE SERPL-MCNC: 153 MG/DL (ref 74–106)
HCT VFR BLD AUTO: 32.6 % (ref 38–47)
HGB BLD-MCNC: 11.7 G/DL (ref 12–16)
IMM GRANULOCYTES # BLD AUTO: 0.05 K/UL (ref 0–0.04)
IMM GRANULOCYTES NFR BLD: 0.6 % (ref 0–0.4)
INR BLD: 2.03 (ref 0.9–1.1)
LYMPHOCYTES # BLD AUTO: 1.54 K/UL (ref 1–4.8)
LYMPHOCYTES NFR BLD AUTO: 18.6 % (ref 27–41)
MCH RBC QN AUTO: 35.5 PG (ref 27–31)
MCHC RBC AUTO-ENTMCNC: 35.9 G/DL (ref 32–36)
MCV RBC AUTO: 98.8 FL (ref 80–96)
MONOCYTES # BLD AUTO: 0.86 K/UL (ref 0–0.8)
MONOCYTES NFR BLD AUTO: 10.4 % (ref 2–6)
MPC BLD CALC-MCNC: 10.5 FL (ref 9.4–12.4)
NEUTROPHILS # BLD AUTO: 5.58 K/UL (ref 1.8–7.7)
NEUTROPHILS NFR BLD AUTO: 67.5 % (ref 53–65)
NRBC # BLD AUTO: 0 X10E3/UL
NRBC, AUTO (.00): 0 %
PLATELET # BLD AUTO: 155 K/UL (ref 150–400)
POC OCCULT BLOOD STOOL: POSITIVE
POTASSIUM SERPL-SCNC: 3.7 MMOL/L (ref 3.5–5.1)
PROT SERPL-MCNC: 6.5 G/DL (ref 6.4–8.2)
PROTHROMBIN TIME: 22.6 SECONDS (ref 11.7–14.7)
RBC # BLD AUTO: 3.3 M/UL (ref 4.2–5.4)
SODIUM SERPL-SCNC: 134 MMOL/L (ref 136–145)
WBC # BLD AUTO: 8.27 K/UL (ref 4.5–11)

## 2021-11-27 PROCEDURE — 85610 PROTHROMBIN TIME: CPT | Performed by: EMERGENCY MEDICINE

## 2021-11-27 PROCEDURE — 82272 OCCULT BLD FECES 1-3 TESTS: CPT

## 2021-11-27 PROCEDURE — 99282 EMERGENCY DEPT VISIT SF MDM: CPT | Mod: ,,, | Performed by: EMERGENCY MEDICINE

## 2021-11-27 PROCEDURE — 99282 PR EMERGENCY DEPT VISIT,LEVEL II: ICD-10-PCS | Mod: ,,, | Performed by: EMERGENCY MEDICINE

## 2021-11-27 PROCEDURE — 99283 EMERGENCY DEPT VISIT LOW MDM: CPT

## 2021-11-27 PROCEDURE — 36415 COLL VENOUS BLD VENIPUNCTURE: CPT | Performed by: NURSE PRACTITIONER

## 2021-11-27 PROCEDURE — 85025 COMPLETE CBC W/AUTO DIFF WBC: CPT | Performed by: NURSE PRACTITIONER

## 2021-11-27 PROCEDURE — 80053 COMPREHEN METABOLIC PANEL: CPT | Performed by: EMERGENCY MEDICINE

## 2021-11-27 PROCEDURE — 85730 THROMBOPLASTIN TIME PARTIAL: CPT | Performed by: EMERGENCY MEDICINE

## 2021-11-29 ENCOUNTER — OFFICE VISIT (OUTPATIENT)
Dept: GASTROENTEROLOGY | Facility: CLINIC | Age: 61
End: 2021-11-29
Payer: COMMERCIAL

## 2021-11-29 VITALS
HEIGHT: 70 IN | BODY MASS INDEX: 31.35 KG/M2 | SYSTOLIC BLOOD PRESSURE: 118 MMHG | DIASTOLIC BLOOD PRESSURE: 66 MMHG | HEART RATE: 86 BPM | OXYGEN SATURATION: 100 % | WEIGHT: 219 LBS

## 2021-11-29 DIAGNOSIS — K62.5 RECTAL BLEEDING: ICD-10-CM

## 2021-11-29 DIAGNOSIS — K72.00 ACUTE LIVER FAILURE WITHOUT HEPATIC COMA: Primary | ICD-10-CM

## 2021-11-29 DIAGNOSIS — K75.9 HEPATITIS: ICD-10-CM

## 2021-11-29 LAB
BASOPHILS # BLD AUTO: 0.07 K/UL (ref 0–0.2)
BASOPHILS NFR BLD AUTO: 0.8 % (ref 0–1)
DIFFERENTIAL METHOD BLD: ABNORMAL
EOSINOPHIL # BLD AUTO: 0.23 K/UL (ref 0–0.5)
EOSINOPHIL NFR BLD AUTO: 2.7 % (ref 1–4)
ERYTHROCYTE [DISTWIDTH] IN BLOOD BY AUTOMATED COUNT: 15.8 % (ref 11.5–14.5)
HCT VFR BLD AUTO: 34 % (ref 38–47)
HGB BLD-MCNC: 12.2 G/DL (ref 12–16)
IMM GRANULOCYTES # BLD AUTO: 0.08 K/UL (ref 0–0.04)
IMM GRANULOCYTES NFR BLD: 0.9 % (ref 0–0.4)
INR BLD: 1.9 (ref 0.9–1.1)
LYMPHOCYTES # BLD AUTO: 1.8 K/UL (ref 1–4.8)
LYMPHOCYTES NFR BLD AUTO: 21.2 % (ref 27–41)
MCH RBC QN AUTO: 35.3 PG (ref 27–31)
MCHC RBC AUTO-ENTMCNC: 35.9 G/DL (ref 32–36)
MCV RBC AUTO: 98.3 FL (ref 80–96)
MONOCYTES # BLD AUTO: 1.04 K/UL (ref 0–0.8)
MONOCYTES NFR BLD AUTO: 12.2 % (ref 2–6)
MPC BLD CALC-MCNC: 10.5 FL (ref 9.4–12.4)
NEUTROPHILS # BLD AUTO: 5.28 K/UL (ref 1.8–7.7)
NEUTROPHILS NFR BLD AUTO: 62.2 % (ref 53–65)
NRBC # BLD AUTO: 0 X10E3/UL
NRBC, AUTO (.00): 0 %
PLATELET # BLD AUTO: 182 K/UL (ref 150–400)
PROTHROMBIN TIME: 21.5 SECONDS (ref 11.7–14.7)
RBC # BLD AUTO: 3.46 M/UL (ref 4.2–5.4)
WBC # BLD AUTO: 8.5 K/UL (ref 4.5–11)

## 2021-11-29 PROCEDURE — 36415 PR COLLECTION VENOUS BLOOD,VENIPUNCTURE: ICD-10-PCS | Mod: ,,, | Performed by: CLINICAL MEDICAL LABORATORY

## 2021-11-29 PROCEDURE — 36415 COLL VENOUS BLD VENIPUNCTURE: CPT | Mod: ,,, | Performed by: CLINICAL MEDICAL LABORATORY

## 2021-11-29 PROCEDURE — 85610 PROTHROMBIN TIME: CPT | Performed by: NURSE PRACTITIONER

## 2021-11-29 PROCEDURE — 85025 CBC WITH DIFFERENTIAL: ICD-10-PCS | Mod: ,,, | Performed by: CLINICAL MEDICAL LABORATORY

## 2021-11-29 PROCEDURE — 85025 COMPLETE CBC W/AUTO DIFF WBC: CPT | Mod: ,,, | Performed by: CLINICAL MEDICAL LABORATORY

## 2021-11-29 PROCEDURE — 99214 OFFICE O/P EST MOD 30 MIN: CPT | Mod: ,,, | Performed by: NURSE PRACTITIONER

## 2021-11-29 PROCEDURE — 99214 PR OFFICE/OUTPT VISIT, EST, LEVL IV, 30-39 MIN: ICD-10-PCS | Mod: ,,, | Performed by: NURSE PRACTITIONER

## 2021-11-29 RX ORDER — SPIRONOLACTONE 50 MG/1
50 TABLET, FILM COATED ORAL DAILY
Qty: 30 TABLET | Refills: 5 | Status: SHIPPED | OUTPATIENT
Start: 2021-11-29 | End: 2022-05-05

## 2021-11-30 LAB
GENETICIST REVIEW: NORMAL
HFE GENE MUT ANL BLD/T: NORMAL
LAB TEST METHOD: NORMAL
MOL DX INTERP BLD/T QL: NORMAL
PROVIDER SIGNING NAME: NORMAL
SPECIMEN SOURCE: NORMAL

## 2021-12-01 ENCOUNTER — ANESTHESIA (OUTPATIENT)
Dept: GASTROENTEROLOGY | Facility: HOSPITAL | Age: 61
End: 2021-12-01
Payer: COMMERCIAL

## 2021-12-01 ENCOUNTER — HOSPITAL ENCOUNTER (OUTPATIENT)
Dept: GASTROENTEROLOGY | Facility: HOSPITAL | Age: 61
Discharge: HOME | End: 2021-12-01
Attending: NURSE PRACTITIONER
Payer: COMMERCIAL

## 2021-12-01 ENCOUNTER — ANESTHESIA EVENT (OUTPATIENT)
Dept: GASTROENTEROLOGY | Facility: HOSPITAL | Age: 61
End: 2021-12-01
Payer: COMMERCIAL

## 2021-12-01 VITALS
HEART RATE: 77 BPM | OXYGEN SATURATION: 99 % | SYSTOLIC BLOOD PRESSURE: 112 MMHG | DIASTOLIC BLOOD PRESSURE: 65 MMHG | SYSTOLIC BLOOD PRESSURE: 81 MMHG | DIASTOLIC BLOOD PRESSURE: 39 MMHG | TEMPERATURE: 97 F | BODY MASS INDEX: 31.35 KG/M2 | OXYGEN SATURATION: 99 % | HEIGHT: 70 IN | HEART RATE: 74 BPM | RESPIRATION RATE: 16 BRPM | WEIGHT: 219 LBS

## 2021-12-01 DIAGNOSIS — B27.09 EPSTEIN-BARR VIRUS HEPATITIS: ICD-10-CM

## 2021-12-01 DIAGNOSIS — K62.5 RECTAL BLEEDING: ICD-10-CM

## 2021-12-01 DIAGNOSIS — K92.2 GASTROINTESTINAL HEMORRHAGE, UNSPECIFIED GASTROINTESTINAL HEMORRHAGE TYPE: ICD-10-CM

## 2021-12-01 DIAGNOSIS — B17.8 EPSTEIN-BARR VIRUS HEPATITIS: ICD-10-CM

## 2021-12-01 DIAGNOSIS — K29.00 ACUTE SUPERFICIAL GASTRITIS WITHOUT HEMORRHAGE: ICD-10-CM

## 2021-12-01 PROCEDURE — 25000003 PHARM REV CODE 250: Performed by: NURSE ANESTHETIST, CERTIFIED REGISTERED

## 2021-12-01 PROCEDURE — 88342 IMHCHEM/IMCYTCHM 1ST ANTB: CPT | Mod: 26,,, | Performed by: PATHOLOGY

## 2021-12-01 PROCEDURE — 37000008 HC ANESTHESIA 1ST 15 MINUTES

## 2021-12-01 PROCEDURE — 37000009 HC ANESTHESIA EA ADD 15 MINS

## 2021-12-01 PROCEDURE — 88305 TISSUE EXAM BY PATHOLOGIST: CPT | Mod: SUR | Performed by: INTERNAL MEDICINE

## 2021-12-01 PROCEDURE — D9220A PRA ANESTHESIA: ICD-10-PCS | Mod: ,,, | Performed by: NURSE ANESTHETIST, CERTIFIED REGISTERED

## 2021-12-01 PROCEDURE — 43239 EGD BIOPSY SINGLE/MULTIPLE: CPT

## 2021-12-01 PROCEDURE — 27201423 OPTIME MED/SURG SUP & DEVICES STERILE SUPPLY

## 2021-12-01 PROCEDURE — 00731 ANES UPR GI NDSC PX NOS: CPT

## 2021-12-01 PROCEDURE — 27000284 HC CANNULA NASAL: Performed by: NURSE ANESTHETIST, CERTIFIED REGISTERED

## 2021-12-01 PROCEDURE — C1889 IMPLANT/INSERT DEVICE, NOC: HCPCS

## 2021-12-01 PROCEDURE — 88305 SURGICAL PATHOLOGY: ICD-10-PCS | Mod: 26,,, | Performed by: PATHOLOGY

## 2021-12-01 PROCEDURE — 27000716 HC OXISENSOR PROBE, ANY SIZE: Performed by: NURSE ANESTHETIST, CERTIFIED REGISTERED

## 2021-12-01 PROCEDURE — 88342 SURGICAL PATHOLOGY: ICD-10-PCS | Mod: 26,,, | Performed by: PATHOLOGY

## 2021-12-01 PROCEDURE — 63600175 PHARM REV CODE 636 W HCPCS: Performed by: NURSE ANESTHETIST, CERTIFIED REGISTERED

## 2021-12-01 PROCEDURE — D9220A PRA ANESTHESIA: Mod: ,,, | Performed by: NURSE ANESTHETIST, CERTIFIED REGISTERED

## 2021-12-01 PROCEDURE — 88305 TISSUE EXAM BY PATHOLOGIST: CPT | Mod: 26,,, | Performed by: PATHOLOGY

## 2021-12-01 RX ORDER — PROPOFOL 10 MG/ML
VIAL (ML) INTRAVENOUS
Status: DISCONTINUED | OUTPATIENT
Start: 2021-12-01 | End: 2021-12-01

## 2021-12-01 RX ORDER — PHENYLEPHRINE HYDROCHLORIDE 10 MG/ML
INJECTION INTRAVENOUS
Status: DISCONTINUED | OUTPATIENT
Start: 2021-12-01 | End: 2021-12-01

## 2021-12-01 RX ORDER — LIDOCAINE HYDROCHLORIDE 20 MG/ML
INJECTION, SOLUTION EPIDURAL; INFILTRATION; INTRACAUDAL; PERINEURAL
Status: DISCONTINUED | OUTPATIENT
Start: 2021-12-01 | End: 2021-12-01

## 2021-12-01 RX ORDER — SODIUM CHLORIDE 0.9 % (FLUSH) 0.9 %
10 SYRINGE (ML) INJECTION
Status: CANCELLED | OUTPATIENT
Start: 2021-12-01

## 2021-12-01 RX ADMIN — SODIUM CHLORIDE: 9 INJECTION, SOLUTION INTRAVENOUS at 02:12

## 2021-12-01 RX ADMIN — PROPOFOL 75 MG: 10 INJECTION, EMULSION INTRAVENOUS at 02:12

## 2021-12-01 RX ADMIN — LIDOCAINE HYDROCHLORIDE 20 MG: 20 INJECTION, SOLUTION INTRAVENOUS at 02:12

## 2021-12-01 RX ADMIN — PROPOFOL 20 MG: 10 INJECTION, EMULSION INTRAVENOUS at 03:12

## 2021-12-01 RX ADMIN — PHENYLEPHRINE HYDROCHLORIDE 100 MCG: 10 INJECTION INTRAVENOUS at 03:12

## 2021-12-02 ENCOUNTER — TELEPHONE (OUTPATIENT)
Dept: GASTROENTEROLOGY | Facility: CLINIC | Age: 61
End: 2021-12-02
Payer: COMMERCIAL

## 2021-12-02 LAB
ESTROGEN SERPL-MCNC: NORMAL PG/ML
LAB AP GROSS DESCRIPTION: NORMAL
LAB AP LABORATORY NOTES: NORMAL
T3RU NFR SERPL: NORMAL %

## 2021-12-14 ENCOUNTER — TELEPHONE (OUTPATIENT)
Dept: GASTROENTEROLOGY | Facility: CLINIC | Age: 61
End: 2021-12-14
Payer: COMMERCIAL

## 2022-02-21 ENCOUNTER — OFFICE VISIT (OUTPATIENT)
Dept: INTERNAL MEDICINE | Facility: CLINIC | Age: 62
End: 2022-02-21
Payer: COMMERCIAL

## 2022-02-21 VITALS
DIASTOLIC BLOOD PRESSURE: 80 MMHG | HEIGHT: 70 IN | HEART RATE: 76 BPM | OXYGEN SATURATION: 99 % | BODY MASS INDEX: 31.35 KG/M2 | WEIGHT: 219 LBS | SYSTOLIC BLOOD PRESSURE: 118 MMHG

## 2022-02-21 DIAGNOSIS — Z76.89 ENCOUNTER TO ESTABLISH CARE WITH NEW DOCTOR: Primary | ICD-10-CM

## 2022-02-21 DIAGNOSIS — I10 ESSENTIAL HYPERTENSION: ICD-10-CM

## 2022-02-21 DIAGNOSIS — K21.9 GASTROESOPHAGEAL REFLUX DISEASE, UNSPECIFIED WHETHER ESOPHAGITIS PRESENT: ICD-10-CM

## 2022-02-21 DIAGNOSIS — Z94.4 HISTORY OF LIVER TRANSPLANT: ICD-10-CM

## 2022-02-21 DIAGNOSIS — N17.9 ACUTE KIDNEY INJURY: ICD-10-CM

## 2022-02-21 DIAGNOSIS — K72.00 ACUTE LIVER FAILURE WITHOUT HEPATIC COMA: ICD-10-CM

## 2022-02-21 PROCEDURE — 3008F BODY MASS INDEX DOCD: CPT | Mod: ,,, | Performed by: INTERNAL MEDICINE

## 2022-02-21 PROCEDURE — 1159F PR MEDICATION LIST DOCUMENTED IN MEDICAL RECORD: ICD-10-PCS | Mod: ,,, | Performed by: INTERNAL MEDICINE

## 2022-02-21 PROCEDURE — 3079F DIAST BP 80-89 MM HG: CPT | Mod: ,,, | Performed by: INTERNAL MEDICINE

## 2022-02-21 PROCEDURE — 99204 PR OFFICE/OUTPT VISIT, NEW, LEVL IV, 45-59 MIN: ICD-10-PCS | Mod: S$PBB,,, | Performed by: INTERNAL MEDICINE

## 2022-02-21 PROCEDURE — 3008F PR BODY MASS INDEX (BMI) DOCUMENTED: ICD-10-PCS | Mod: ,,, | Performed by: INTERNAL MEDICINE

## 2022-02-21 PROCEDURE — 1159F MED LIST DOCD IN RCRD: CPT | Mod: ,,, | Performed by: INTERNAL MEDICINE

## 2022-02-21 PROCEDURE — 99204 OFFICE O/P NEW MOD 45 MIN: CPT | Mod: S$PBB,,, | Performed by: INTERNAL MEDICINE

## 2022-02-21 PROCEDURE — 3074F PR MOST RECENT SYSTOLIC BLOOD PRESSURE < 130 MM HG: ICD-10-PCS | Mod: ,,, | Performed by: INTERNAL MEDICINE

## 2022-02-21 PROCEDURE — 99214 OFFICE O/P EST MOD 30 MIN: CPT | Mod: PBBFAC | Performed by: INTERNAL MEDICINE

## 2022-02-21 PROCEDURE — 3074F SYST BP LT 130 MM HG: CPT | Mod: ,,, | Performed by: INTERNAL MEDICINE

## 2022-02-21 PROCEDURE — 3079F PR MOST RECENT DIASTOLIC BLOOD PRESSURE 80-89 MM HG: ICD-10-PCS | Mod: ,,, | Performed by: INTERNAL MEDICINE

## 2022-02-21 RX ORDER — ASPIRIN 81 MG/1
81 TABLET ORAL DAILY
COMMUNITY
Start: 2022-02-15

## 2022-02-21 RX ORDER — SIMVASTATIN 10 MG/1
10 TABLET, FILM COATED ORAL NIGHTLY
COMMUNITY
End: 2022-05-05

## 2022-02-21 RX ORDER — LANOLIN ALCOHOL/MO/W.PET/CERES
1 CREAM (GRAM) TOPICAL DAILY
COMMUNITY
Start: 2022-02-15

## 2022-02-21 RX ORDER — MYCOPHENOLIC ACID 360 MG/1
360 TABLET, DELAYED RELEASE ORAL 2 TIMES DAILY
COMMUNITY
Start: 2022-02-15

## 2022-02-21 RX ORDER — PREDNISONE 5 MG/1
5 TABLET ORAL DAILY
COMMUNITY
End: 2022-05-05

## 2022-02-21 RX ORDER — FLUCONAZOLE 200 MG/1
TABLET ORAL
COMMUNITY
Start: 2021-12-10

## 2022-02-21 RX ORDER — ESOMEPRAZOLE MAGNESIUM 40 MG/1
40 CAPSULE, DELAYED RELEASE ORAL EVERY MORNING
COMMUNITY
Start: 2022-02-15

## 2022-02-21 RX ORDER — MOMETASONE FUROATE 1 MG/G
CREAM TOPICAL DAILY
COMMUNITY

## 2022-02-21 RX ORDER — AMLODIPINE BESYLATE 5 MG/1
5 TABLET ORAL DAILY
COMMUNITY
Start: 2022-02-15

## 2022-02-21 RX ORDER — SULFAMETHOXAZOLE AND TRIMETHOPRIM 400; 80 MG/1; MG/1
1 TABLET ORAL
COMMUNITY

## 2022-02-21 NOTE — PROGRESS NOTES
Subjective:       Patient ID: Luis Alfredo Rosario is a 61 y.o. female.    Chief Complaint: Establish Care    The patient is a 61-year-old white female the presents today to establish care.  She has a history of idiopathic liver failure that resulted in a liver transplant on December 5, 2021. She also has a history of hypertension, and dyslipidemia, and GERD.  Currently she has home health that comes to her house.  She follows with the transplant team at [facility] every other week.  They also followed drug levels.  She is doing well and recovering well since her surgery.  Today she is resting comfortably in no distress.  She is afebrile vital signs are stable.    Review of Systems   Constitutional: Positive for activity change and unexpected weight change. Negative for appetite change, chills, fatigue and fever.   HENT: Negative for nasal congestion, ear pain, hearing loss, rhinorrhea, sinus pressure/congestion, sore throat and trouble swallowing.    Eyes: Negative for pain, discharge, redness and visual disturbance.   Respiratory: Negative for apnea, cough, chest tightness, shortness of breath and wheezing.    Cardiovascular: Negative for chest pain and palpitations.   Gastrointestinal: Negative for abdominal pain, blood in stool, constipation, diarrhea, nausea and vomiting.   Endocrine: Negative for cold intolerance, heat intolerance, polydipsia and polyuria.   Genitourinary: Negative for difficulty urinating, dysuria, hematuria and menstrual problem.   Musculoskeletal: Negative for arthralgias, back pain, joint swelling, myalgias and neck pain.   Integumentary:  Negative for pallor, rash and wound.   Allergic/Immunologic: Negative for immunocompromised state.   Neurological: Positive for weakness. Negative for tremors, seizures, headaches and memory loss.   Hematological: Negative for adenopathy.   Psychiatric/Behavioral: Negative for confusion, dysphoric mood and sleep disturbance. The patient is not nervous/anxious.           Objective:      Physical Exam  Vitals and nursing note reviewed.   Constitutional:       General: She is not in acute distress.     Appearance: Normal appearance. She is not ill-appearing.   HENT:      Head: Normocephalic and atraumatic.      Right Ear: External ear normal.      Left Ear: External ear normal.      Nose: Nose normal.      Mouth/Throat:      Pharynx: Oropharynx is clear.   Eyes:      Extraocular Movements: Extraocular movements intact.      Conjunctiva/sclera: Conjunctivae normal.      Pupils: Pupils are equal, round, and reactive to light.   Cardiovascular:      Rate and Rhythm: Normal rate and regular rhythm.      Pulses: Normal pulses.      Heart sounds: Normal heart sounds. No murmur heard.  Pulmonary:      Effort: No respiratory distress.      Breath sounds: Normal breath sounds. No wheezing or rales.   Abdominal:      General: Bowel sounds are normal. There is no distension.      Palpations: Abdomen is soft. There is no mass.      Comments: Incisional scar across abdomen   Musculoskeletal:         General: Normal range of motion.      Cervical back: Normal range of motion and neck supple.      Right lower leg: No edema.      Left lower leg: No edema.   Skin:     General: Skin is warm and dry.      Capillary Refill: Capillary refill takes less than 2 seconds.      Coloration: Skin is not jaundiced or pale.   Neurological:      General: No focal deficit present.      Mental Status: She is alert and oriented to person, place, and time.      Cranial Nerves: No cranial nerve deficit.   Psychiatric:         Mood and Affect: Mood normal.         Judgment: Judgment normal.         Assessment:       Problem List Items Addressed This Visit        Cardiac/Vascular    Essential hypertension       Renal/    Acute kidney injury       GI    Acute liver failure without hepatic coma    GERD (gastroesophageal reflux disease)    History of liver transplant    Relevant Medications    mycophenolate  (MYFORTIC) 360 MG TbEC    predniSONE (DELTASONE) 5 MG tablet      Other Visit Diagnoses     Encounter to establish care with new doctor    -  Primary          Plan:       1. The patient presents today to establish care.  She has undergone all a significant medical procedure within the past several months.  She had a liver transplant:  December 5th 2021. She follows with transplant hepatology at [facility] every other week.  We have reviewed labs were from February to 16. Age-appropriate health maintenance issues are up-to-date    2. History of acute liver failure and transplant-back in October of 2021 the patient started noticing increased swelling.  liver associated enzymes were elevated.  She had ultrasound that did not reveal any acute abnormality.  She presented to the emergency department at [facility] on December 1st.  At that point she was child Hussein C and had a meld score of 30. She was transferred to [facility] on December 2nd.  She underwent a liver transplant on December 5th.  Etiology of her liver failure is uncertain.  Currently she sees the transplant team every other week.  She has [facility] that comes out.  She has lab work frequently to check her drug levels.  She is on mycophenolate, prednisone, tacrolimus.  She is also all valganciclovir.  Most recent labs show normal out 5 ALT and AST is up to 3.7.  Total bilirubin is 0.3.    3. Essential hypertension-blood pressure looks great today.  She is on amlodipine 5 mg daily.  Blood pressure is 118/80    4. Acute kidney injury-this happened during her acute liver failure.  Most recent creatinine is 1.16.      5. Dyslipidemia-she has statin listed on her medications.  However she is not sure if she is currently taking.    6. Macrocytic anemia-this is also thought to be related to 2. No active bleeding.  Most recent H&H 10.3 and 33.1.  MCV is 103.1 and RDW was 15.4      The patient is current with laps.  She has refills currently.  She has frequent follow-up  with the transplant team at [facility].  We will be here for any acute issues.  Otherwise the patient will follow up in 6 months.

## 2022-05-05 ENCOUNTER — OFFICE VISIT (OUTPATIENT)
Dept: FAMILY MEDICINE | Facility: CLINIC | Age: 62
End: 2022-05-05
Payer: OTHER GOVERNMENT

## 2022-05-05 VITALS
SYSTOLIC BLOOD PRESSURE: 130 MMHG | HEIGHT: 70 IN | WEIGHT: 176.63 LBS | BODY MASS INDEX: 25.29 KG/M2 | DIASTOLIC BLOOD PRESSURE: 84 MMHG | OXYGEN SATURATION: 99 % | TEMPERATURE: 98 F | RESPIRATION RATE: 18 BRPM | HEART RATE: 74 BPM

## 2022-05-05 DIAGNOSIS — I10 PRIMARY HYPERTENSION: ICD-10-CM

## 2022-05-05 DIAGNOSIS — K21.9 GASTROESOPHAGEAL REFLUX DISEASE WITHOUT ESOPHAGITIS: ICD-10-CM

## 2022-05-05 DIAGNOSIS — Z94.4 HISTORY OF LIVER TRANSPLANT: Primary | ICD-10-CM

## 2022-05-05 DIAGNOSIS — E78.49 OTHER HYPERLIPIDEMIA: ICD-10-CM

## 2022-05-05 PROCEDURE — 99214 OFFICE O/P EST MOD 30 MIN: CPT | Mod: ,,, | Performed by: INTERNAL MEDICINE

## 2022-05-05 PROCEDURE — 99214 PR OFFICE/OUTPT VISIT, EST, LEVL IV, 30-39 MIN: ICD-10-PCS | Mod: ,,, | Performed by: INTERNAL MEDICINE

## 2022-05-05 RX ORDER — TACROLIMUS 1 MG/1
3 CAPSULE ORAL
COMMUNITY
Start: 2022-05-04

## 2022-05-05 NOTE — PATIENT INSTRUCTIONS
Melovladimirlore was seen today for establish care and hypertension.    Diagnoses and all orders for this visit:    History of liver transplant  -     Ambulatory referral/consult to Transplant, Liver; Future    Primary hypertension  -     Ambulatory referral/consult to Cardiology; Future    Gastroesophageal reflux disease without esophagitis    Other hyperlipidemia

## 2022-05-05 NOTE — PROGRESS NOTES
Subjective:       Patient ID: Luis Alfredo Rosario is a 62 y.o. female.    Chief Complaint: Establish Care and Hypertension    Patient is here today for new patient evaluation. Patient has a past medical history of hypertension, hyperlipidemia, and GERD and recent liver transplant in December. Currently on transplant medications and tolerating well. Following with Dr Drew . Recent labs reviewed and shows liver functions normal. Patient here today with no complaints and wishing to establish care. Patient requesting referrals to [facility] Liver transplant and [facility] Cardiology with Dr Causey. Will follow in 3 months.       Current Medications:    Current Outpatient Medications:     amLODIPine (NORVASC) 5 MG tablet, Take 5 mg by mouth once daily., Disp: , Rfl:     aspirin (ECOTRIN) 81 MG EC tablet, Take 81 mg by mouth once daily., Disp: , Rfl:     esomeprazole (NEXIUM) 40 MG capsule, Take 40 mg by mouth every morning., Disp: , Rfl:     magnesium oxide (MAG-OX) 400 mg (241.3 mg magnesium) tablet, Take 1 tablet by mouth once daily., Disp: , Rfl:     mycophenolate (MYFORTIC) 360 MG TbEC, Take 360 mg by mouth 2 (two) times daily., Disp: , Rfl:     sulfamethoxazole-trimethoprim 400-80mg (BACTRIM) 400-80 mg per tablet, Take 1 tablet by mouth every 12 (twelve) hours. Take 1 tab by mouth on Monday, Wednesday, and Fridays only, Disp: , Rfl:     tacrolimus (PROGRAF) 1 MG Cap, Take 3 mg by mouth., Disp: , Rfl:     calcium-vitamin D3 (OS-NIKI 500 + D3) 500 mg(1,250mg) -200 unit per tablet, Take 1 tablet by mouth once daily. (Patient not taking: Reported on 2/21/2022), Disp: 30 tablet, Rfl: 3    fluconazole (DIFLUCAN) 200 MG Tab, TAKE 1 TABLET BY MOUTH DAILY TO prevent fungal infection, Disp: , Rfl:     mometasone 0.1% (ELOCON) 0.1 % cream, Apply topically once daily., Disp: , Rfl:     Last Labs:     Lab Visit on 04/14/2022   Component Date Value    Tacrolimus 04/14/2022 See Reference Lab Report     Phosphorus 04/14/2022 3.8      Magnesium 04/14/2022 1.6 (A)    GGT 04/14/2022 42     Sodium 04/14/2022 139     Potassium 04/14/2022 4.5     Chloride 04/14/2022 106     CO2 04/14/2022 29     Anion Gap 04/14/2022 9     Glucose 04/14/2022 84     BUN 04/14/2022 16     Creatinine 04/14/2022 1.43 (A)    BUN/Creatinine Ratio 04/14/2022 11     Calcium 04/14/2022 9.9     Total Protein 04/14/2022 7.3     Albumin 04/14/2022 4.2     Globulin 04/14/2022 3.1     A/G Ratio 04/14/2022 1.4     Bilirubin, Total 04/14/2022 0.3     Alk Phos 04/14/2022 102     ALT 04/14/2022 22     AST 04/14/2022 16     eGFR 04/14/2022 40 (A)    Bilirubin, Direct 04/14/2022 <0.1     WBC 04/14/2022 3.89 (A)    RBC 04/14/2022 3.82 (A)    Hemoglobin 04/14/2022 11.6 (A)    Hematocrit 04/14/2022 37.5 (A)    MCV 04/14/2022 98.2 (A)    MCH 04/14/2022 30.4     MCHC 04/14/2022 30.9 (A)    RDW 04/14/2022 12.6     Platelet Count 04/14/2022 283     MPV 04/14/2022 9.4     Neutrophils % 04/14/2022 52.4 (A)    Lymphocytes % 04/14/2022 31.4     Monocytes % 04/14/2022 11.1 (A)    Eosinophils % 04/14/2022 4.1 (A)    Basophils % 04/14/2022 0.5     Immature Granulocytes % 04/14/2022 0.5 (A)    nRBC, Auto 04/14/2022 0.0     Neutrophils, Abs 04/14/2022 2.04     Lymphocytes, Absolute 04/14/2022 1.22     Monocytes, Absolute 04/14/2022 0.43     Eosinophils, Absolute 04/14/2022 0.16     Basophils, Absolute 04/14/2022 0.02     Immature Granulocytes, A* 04/14/2022 0.02     nRBC, Absolute 04/14/2022 0.00     Diff Type 04/14/2022 Auto        Last Imaging:  EGD  Narrative: Procedure Date  12/1/21    Impression  Overall   Impression:  No varices were seen. The mucosa has jaundice color. A   5cm hiatus hernia is noted. No gastric varices, ulcers or bleeding is   noted. The stomach has diffuse erythema suggesting portal hypertensive   gastropathy, biopsies were obtained. Mucosa of the duodenum is normal. No   abnormality of the ampulla is seen.    Recommendation  Await  pathology results; avoid nsaids; keep [facility] Hepatology appt and   colonoscopy appt.    Indication  Rectal bleeding/ gi bleed/acute hepatitis     Providers  Attending: Manny Drew MD  Fellow:    Other Staff: Addi Sawnson RN, Vicenta Mckeon, Patient   Care Assistant, Stiven Wallis CRNA    Medications  Moderate sedation administered by anesthesia staff - See anesthesia   record.    Preprocedure  A history and physical has been performed, and patient medication   allergies have been reviewed. The patient's tolerance of previous   anesthesia has been reviewed. The risks and benefits of the procedure and   the sedation options and risks were discussed with the patient. All   questions were answered and informed consent obtained.    ASA Score: ASA 3 - Patient with moderate systemic disease with functional   limitations  Mallampati Airway Score: II (hard and soft palate, upper portion of   tonsils anduvula visible)    Details of the Procedure  The patient underwent monitored anesthesia care, which was administered by   an anesthesia professional. The patient's blood pressure, heart rate,   level of consciousness, oxygen, respirations, ECG and ETCO2 were monitored   throughout the procedure. The scope was advanced to the third part of the   duodenum. Retroflexion was performed in the cardia. Prior to the   procedure, the patient's H. Pylori status was unknown. The patient's   estimated blood loss was minimal (<5 mL). The procedure was not difficult.   The patient tolerated the procedure well. There were no apparent   complications.     Scope: Gastroscope  Scope Serial: [id number]    Events  Procedure Events   Event Event Time     Procedure Events   Event Event Time   SCOPE IN 12/1/2021  2:59 PM   SCOPE OUT 12/1/2021  3:03 PM     Findings  5 cm hiatal hernia         Review of Systems   All other systems reviewed and are negative.        Objective:      Physical Exam  Vitals reviewed.    Constitutional:       Appearance: Normal appearance.   Cardiovascular:      Rate and Rhythm: Normal rate and regular rhythm.      Pulses: Normal pulses.      Heart sounds: Normal heart sounds.   Pulmonary:      Effort: Pulmonary effort is normal.      Breath sounds: Normal breath sounds.   Abdominal:      General: Abdomen is flat. Bowel sounds are normal.      Palpations: Abdomen is soft.   Musculoskeletal:         General: Normal range of motion.      Cervical back: Normal range of motion and neck supple.   Skin:     General: Skin is warm and dry.   Neurological:      General: No focal deficit present.      Mental Status: She is alert and oriented to person, place, and time. Mental status is at baseline.         Assessment:       1. History of liver transplant  Ambulatory referral/consult to Transplant, Liver   2. Primary hypertension  Ambulatory referral/consult to Cardiology   3. Gastroesophageal reflux disease without esophagitis     4. Other hyperlipidemia          Plan:         Luis Alfredo was seen today for establish care and hypertension.    Diagnoses and all orders for this visit:    History of liver transplant  -     Ambulatory referral/consult to Transplant, Liver; Future    Primary hypertension  -     Ambulatory referral/consult to Cardiology; Future    Gastroesophageal reflux disease without esophagitis    Other hyperlipidemia

## 2022-05-18 ENCOUNTER — TELEPHONE (OUTPATIENT)
Dept: FAMILY MEDICINE | Facility: CLINIC | Age: 62
End: 2022-05-18
Payer: OTHER GOVERNMENT

## 2022-05-18 NOTE — TELEPHONE ENCOUNTER
----- Message from Ganesh Dewey RN sent at 5/17/2022 12:09 PM CDT -----  Regarding: Question about referral  Contact: Self  Question about referral to cardiologist, Dr. Albarado, in [place].    Phone: [phone]    Patient had concern on whether the cardiology referral was in. She says due to insurance change from [employer], she needs referrals. Patient voices she has an appointment with Dr. Causey on 6-13-22 and just wanted to make sure the referral was in. Informed her that referral is in system and pending at this time.

## 2022-07-22 ENCOUNTER — TELEPHONE (OUTPATIENT)
Dept: FAMILY MEDICINE | Facility: CLINIC | Age: 62
End: 2022-07-22
Payer: OTHER GOVERNMENT

## 2022-07-22 DIAGNOSIS — Z00.00 ENCOUNTER FOR GENERAL ADULT MEDICAL EXAMINATION W/O ABNORMAL FINDINGS: Primary | ICD-10-CM

## 2022-07-22 NOTE — TELEPHONE ENCOUNTER
----- Message from Tali Lala sent at 7/22/2022 10:28 AM CDT -----  Patient needs a call back as soon as possible concerning a referral    Patient call back [phone]    Called patient re: above message. She says she needs referral for yearly eye exam. Appointment already scheduled for Dr. Palacios next week.

## 2022-08-08 ENCOUNTER — HOSPITAL ENCOUNTER (OUTPATIENT)
Dept: RADIOLOGY | Facility: HOSPITAL | Age: 62
Discharge: HOME | End: 2022-08-08
Attending: NURSE PRACTITIONER
Payer: OTHER GOVERNMENT

## 2022-08-08 DIAGNOSIS — M79.641 RIGHT HAND PAIN: ICD-10-CM

## 2022-08-08 PROCEDURE — 73130 X-RAY EXAM OF HAND: CPT | Mod: 26,RT,, | Performed by: RADIOLOGY

## 2022-08-08 PROCEDURE — 73130 X-RAY EXAM OF HAND: CPT | Mod: TC,RT

## 2022-08-08 PROCEDURE — 73130 XR HAND COMPLETE 3 VIEW RIGHT: ICD-10-PCS | Mod: 26,RT,, | Performed by: RADIOLOGY

## 2022-08-23 ENCOUNTER — HOSPITAL ENCOUNTER (OUTPATIENT)
Dept: RADIOLOGY | Facility: HOSPITAL | Age: 62
Discharge: HOME | End: 2022-08-23
Attending: NURSE PRACTITIONER
Payer: OTHER GOVERNMENT

## 2022-08-23 VITALS — WEIGHT: 176 LBS | BODY MASS INDEX: 25.25 KG/M2

## 2022-08-23 DIAGNOSIS — Z12.31 ENCOUNTER FOR SCREENING MAMMOGRAM FOR MALIGNANT NEOPLASM OF BREAST: ICD-10-CM

## 2022-08-23 PROCEDURE — 77067 SCR MAMMO BI INCL CAD: CPT | Mod: TC

## 2022-09-07 PROBLEM — M65.311 TRIGGER THUMB OF RIGHT HAND: Status: ACTIVE | Noted: 2022-09-07

## 2023-02-15 ENCOUNTER — OFFICE VISIT (OUTPATIENT)
Dept: ORTHOPEDICS | Facility: CLINIC | Age: 63
End: 2023-02-15
Payer: OTHER GOVERNMENT

## 2023-02-15 DIAGNOSIS — M79.641 PAIN OF RIGHT HAND: Primary | ICD-10-CM

## 2023-02-15 PROCEDURE — 20550 PR INJECT TENDON SHEATH/LIGAMENT: ICD-10-PCS | Mod: S$PBB,F5,, | Performed by: ORTHOPAEDIC SURGERY

## 2023-02-15 PROCEDURE — 20550 NJX 1 TENDON SHEATH/LIGAMENT: CPT | Mod: PBBFAC,RT | Performed by: ORTHOPAEDIC SURGERY

## 2023-02-15 PROCEDURE — 20605 DRAIN/INJ JOINT/BURSA W/O US: CPT | Mod: PBBFAC | Performed by: ORTHOPAEDIC SURGERY

## 2023-02-15 PROCEDURE — 20550 NJX 1 TENDON SHEATH/LIGAMENT: CPT | Mod: S$PBB,F5,, | Performed by: ORTHOPAEDIC SURGERY

## 2023-02-15 PROCEDURE — 99213 OFFICE O/P EST LOW 20 MIN: CPT | Mod: S$PBB,25,, | Performed by: ORTHOPAEDIC SURGERY

## 2023-02-15 PROCEDURE — 99213 OFFICE O/P EST LOW 20 MIN: CPT | Mod: PBBFAC,25 | Performed by: ORTHOPAEDIC SURGERY

## 2023-02-15 PROCEDURE — 99213 PR OFFICE/OUTPT VISIT, EST, LEVL III, 20-29 MIN: ICD-10-PCS | Mod: S$PBB,25,, | Performed by: ORTHOPAEDIC SURGERY

## 2023-02-15 RX ORDER — BUPIVACAINE HYDROCHLORIDE 2.5 MG/ML
1 INJECTION, SOLUTION INFILTRATION; PERINEURAL
Status: COMPLETED | OUTPATIENT
Start: 2023-02-15 | End: 2023-02-15

## 2023-02-15 RX ORDER — TRIAMCINOLONE ACETONIDE 40 MG/ML
40 INJECTION, SUSPENSION INTRA-ARTICULAR; INTRAMUSCULAR
Status: COMPLETED | OUTPATIENT
Start: 2023-02-15 | End: 2023-02-15

## 2023-02-15 RX ADMIN — TRIAMCINOLONE ACETONIDE 40 MG: 40 INJECTION, SUSPENSION INTRA-ARTICULAR; INTRAMUSCULAR at 12:02

## 2023-02-15 RX ADMIN — BUPIVACAINE HYDROCHLORIDE 2.5 MG: 2.5 INJECTION, SOLUTION INFILTRATION; PERINEURAL at 12:02

## 2023-02-15 NOTE — PROGRESS NOTES
Patient is here for right hand pain she had the triggering of her long and ring fingers this is resolved with the injections.  She is now locking down over her thumb.  Tender over the A1 pulley of the thumb.  No instability of the thumb.  X-rays show no fractures of the thumb no degenerative changes.  At this time her right thumb was prepped sterilely discussed treatment options injected her thumb with 0.5 cc Marcaine 0.5 cc of Kenalog.  Patient tolerated procedure well verbal consent given.  Patient is not allergic to Marcaine she is allergic to Novocain

## 2023-09-29 ENCOUNTER — HOSPITAL ENCOUNTER (OUTPATIENT)
Dept: RADIOLOGY | Facility: HOSPITAL | Age: 63
Discharge: HOME | End: 2023-09-29
Payer: OTHER GOVERNMENT

## 2023-09-29 VITALS — BODY MASS INDEX: 24.14 KG/M2 | HEIGHT: 69 IN | WEIGHT: 163 LBS

## 2023-09-29 DIAGNOSIS — Z12.31 ENCOUNTER FOR SCREENING MAMMOGRAM FOR MALIGNANT NEOPLASM OF BREAST: ICD-10-CM

## 2023-09-29 PROCEDURE — 77067 SCR MAMMO BI INCL CAD: CPT | Mod: TC

## 2024-10-08 ENCOUNTER — HOSPITAL ENCOUNTER (OUTPATIENT)
Dept: RADIOLOGY | Facility: HOSPITAL | Age: 64
Discharge: HOME | End: 2024-10-08
Attending: NURSE PRACTITIONER
Payer: MEDICARE

## 2024-10-08 VITALS — BODY MASS INDEX: 24.44 KG/M2 | HEIGHT: 69 IN | WEIGHT: 165 LBS

## 2024-10-08 DIAGNOSIS — Z12.31 BREAST CANCER SCREENING BY MAMMOGRAM: ICD-10-CM

## 2024-10-08 PROCEDURE — 77067 SCR MAMMO BI INCL CAD: CPT | Mod: TC

## 2024-10-08 PROCEDURE — 77063 BREAST TOMOSYNTHESIS BI: CPT | Mod: TC

## 2024-10-08 PROCEDURE — 77067 SCR MAMMO BI INCL CAD: CPT | Mod: 26,,, | Performed by: RADIOLOGY

## 2024-10-08 PROCEDURE — 77063 BREAST TOMOSYNTHESIS BI: CPT | Mod: 26,,, | Performed by: RADIOLOGY

## 2025-01-27 ENCOUNTER — TELEPHONE (OUTPATIENT)
Dept: FAMILY MEDICINE | Facility: CLINIC | Age: 65
End: 2025-01-27
Payer: MEDICARE

## 2025-01-27 NOTE — TELEPHONE ENCOUNTER
----- Message from Tony sent at 1/27/2025  9:16 AM CST -----  Regarding: pt needing yaritza today  Who Called: Luis Alfredo Rosario    Pt was wondering if she could be seen today    Preferred Method of Contact: Phone Call  Patient's Preferred Phone Number on File: [phone]   Best Call Back Number, if different:  Additional Information:

## 2025-01-27 NOTE — TELEPHONE ENCOUNTER
Spoke to pt and she is wanting same day appt for sinus issues. I informed pt we typically do not have any same day appts. Pt is going to see her current pcp.   Pt has not been seen by dr powell in almost 3 years. Next open appt made for pt

## 2025-03-01 ENCOUNTER — APPOINTMENT (OUTPATIENT)
Dept: RADIOLOGY | Facility: CLINIC | Age: 65
End: 2025-03-01
Attending: FAMILY MEDICINE
Payer: MEDICARE

## 2025-03-01 ENCOUNTER — OFFICE VISIT (OUTPATIENT)
Dept: FAMILY MEDICINE | Facility: CLINIC | Age: 65
End: 2025-03-01
Payer: MEDICARE

## 2025-03-01 VITALS
OXYGEN SATURATION: 100 % | WEIGHT: 176 LBS | TEMPERATURE: 98 F | HEART RATE: 67 BPM | BODY MASS INDEX: 26.07 KG/M2 | SYSTOLIC BLOOD PRESSURE: 142 MMHG | RESPIRATION RATE: 20 BRPM | DIASTOLIC BLOOD PRESSURE: 82 MMHG | HEIGHT: 69 IN

## 2025-03-01 DIAGNOSIS — S93.602A SPRAIN OF LEFT FOOT, INITIAL ENCOUNTER: Primary | ICD-10-CM

## 2025-03-01 DIAGNOSIS — M79.672 LEFT FOOT PAIN: ICD-10-CM

## 2025-03-01 PROCEDURE — 73630 X-RAY EXAM OF FOOT: CPT | Mod: TC,RHCUB,LT | Performed by: FAMILY MEDICINE

## 2025-03-01 PROCEDURE — 99213 OFFICE O/P EST LOW 20 MIN: CPT | Mod: ,,, | Performed by: FAMILY MEDICINE

## 2025-03-01 PROCEDURE — 73630 X-RAY EXAM OF FOOT: CPT | Mod: 26,LT,, | Performed by: RADIOLOGY

## 2025-03-01 PROCEDURE — 96372 THER/PROPH/DIAG INJ SC/IM: CPT | Mod: ,,, | Performed by: FAMILY MEDICINE

## 2025-03-01 RX ORDER — PREDNISONE 20 MG/1
20 TABLET ORAL DAILY
Qty: 3 TABLET | Refills: 0 | Status: SHIPPED | OUTPATIENT
Start: 2025-03-01 | End: 2025-03-04

## 2025-03-01 RX ORDER — DEXAMETHASONE SODIUM PHOSPHATE 4 MG/ML
4 INJECTION, SOLUTION INTRA-ARTICULAR; INTRALESIONAL; INTRAMUSCULAR; INTRAVENOUS; SOFT TISSUE
Status: COMPLETED | OUTPATIENT
Start: 2025-03-01 | End: 2025-03-01

## 2025-03-01 RX ADMIN — DEXAMETHASONE SODIUM PHOSPHATE 4 MG: 4 INJECTION, SOLUTION INTRA-ARTICULAR; INTRALESIONAL; INTRAMUSCULAR; INTRAVENOUS; SOFT TISSUE at 10:03

## 2025-03-01 NOTE — PROGRESS NOTES
Subjective:       Patient ID: Luis Alfredo Rosario is a 64 y.o. female.    Chief Complaint: Foot Pain (Hurt her Left foot a few weeks ago while playing with grandchild, painful knot on inner side of foot)    Foot Pain  Associated symptoms include joint swelling. Pertinent negatives include no abdominal pain, arthralgias, change in bowel habit, chest pain, chills, congestion, coughing, diaphoresis, fatigue, fever, headaches, myalgias, nausea, neck pain, numbness, rash, sore throat, vertigo, vomiting or weakness.     Review of Systems   Constitutional:  Negative for activity change, appetite change, chills, diaphoresis, fatigue, fever and unexpected weight change.   HENT:  Negative for nasal congestion, dental problem, drooling, ear discharge, ear pain, facial swelling, hearing loss, mouth sores, nosebleeds, postnasal drip, rhinorrhea, sinus pressure/congestion, sneezing, sore throat, tinnitus, trouble swallowing, voice change and goiter.    Eyes:  Negative for photophobia, discharge, itching and visual disturbance.   Respiratory:  Negative for apnea, cough, choking, chest tightness, shortness of breath, wheezing and stridor.    Cardiovascular:  Negative for chest pain, palpitations, leg swelling and claudication.   Gastrointestinal:  Negative for abdominal distention, abdominal pain, anal bleeding, blood in stool, change in bowel habit, constipation, diarrhea, nausea and vomiting.   Endocrine: Negative for cold intolerance, heat intolerance, polydipsia, polyphagia and polyuria.   Genitourinary:  Negative for bladder incontinence, decreased urine volume, difficulty urinating, dysuria, enuresis, flank pain, frequency, hematuria, nocturia, pelvic pain and urgency.   Musculoskeletal:  Positive for joint swelling. Negative for arthralgias, back pain, gait problem, leg pain, myalgias, neck pain, neck stiffness and joint deformity.   Integumentary:  Negative for pallor, rash, wound, breast mass and breast tenderness.    Allergic/Immunologic: Negative for environmental allergies, food allergies and immunocompromised state.   Neurological:  Negative for dizziness, vertigo, tremors, seizures, syncope, facial asymmetry, speech difficulty, weakness, light-headedness, numbness, headaches, memory loss and coordination difficulties.   Hematological:  Negative for adenopathy. Does not bruise/bleed easily.   Psychiatric/Behavioral:  Negative for agitation, behavioral problems, confusion, decreased concentration, dysphoric mood, hallucinations, self-injury, sleep disturbance and suicidal ideas. The patient is not nervous/anxious and is not hyperactive.    Breast: Negative for mass and tenderness        Objective:      Physical Exam  Vitals reviewed.   Constitutional:       Appearance: Normal appearance.   HENT:      Head: Normocephalic and atraumatic.      Right Ear: Tympanic membrane, ear canal and external ear normal.      Left Ear: Tympanic membrane, ear canal and external ear normal.      Nose: Nose normal.      Mouth/Throat:      Mouth: Mucous membranes are moist.      Pharynx: Oropharynx is clear.   Eyes:      Extraocular Movements: Extraocular movements intact.      Conjunctiva/sclera: Conjunctivae normal.      Pupils: Pupils are equal, round, and reactive to light.   Cardiovascular:      Rate and Rhythm: Normal rate and regular rhythm.      Pulses: Normal pulses.      Heart sounds: Normal heart sounds.   Pulmonary:      Effort: Pulmonary effort is normal.      Breath sounds: Normal breath sounds.   Abdominal:      General: Bowel sounds are normal.      Palpations: Abdomen is soft.   Musculoskeletal:         General: Tenderness present. Normal range of motion.      Cervical back: Normal range of motion and neck supple.      Comments: Mild swelling of medial aspect of ankle, ttp.    Skin:     General: Skin is warm and dry.   Neurological:      General: No focal deficit present.      Mental Status: She is alert. Mental status is at  baseline.   Psychiatric:         Mood and Affect: Mood normal.         Behavior: Behavior normal.         Thought Content: Thought content normal.         Judgment: Judgment normal.         Assessment:       1. Sprain of left foot, initial encounter    2. Left foot pain        Plan:     Sprain of left foot, initial encounter  -     dexAMETHasone injection 4 mg  -     predniSONE (DELTASONE) 20 MG tablet; Take 1 tablet (20 mg total) by mouth once daily. for 3 days  Dispense: 3 tablet; Refill: 0    Left foot pain  -     X-Ray Foot Complete 3 view Left; Future; Expected date: 03/01/2025     Treat with rice therapy, followup if no improvement.

## 2025-09-02 ENCOUNTER — APPOINTMENT (OUTPATIENT)
Dept: RADIOLOGY | Facility: CLINIC | Age: 65
End: 2025-09-02
Attending: INTERNAL MEDICINE
Payer: MEDICARE

## 2025-09-02 ENCOUNTER — OFFICE VISIT (OUTPATIENT)
Dept: FAMILY MEDICINE | Facility: CLINIC | Age: 65
End: 2025-09-02
Payer: MEDICARE

## 2025-09-02 VITALS
TEMPERATURE: 98 F | DIASTOLIC BLOOD PRESSURE: 80 MMHG | BODY MASS INDEX: 25.33 KG/M2 | WEIGHT: 171 LBS | SYSTOLIC BLOOD PRESSURE: 132 MMHG | HEIGHT: 69 IN | OXYGEN SATURATION: 95 % | HEART RATE: 68 BPM

## 2025-09-02 DIAGNOSIS — Z94.4 HISTORY OF LIVER TRANSPLANT: ICD-10-CM

## 2025-09-02 DIAGNOSIS — Z09 FOLLOW-UP EXAM: Primary | ICD-10-CM

## 2025-09-02 DIAGNOSIS — I10 ESSENTIAL HYPERTENSION: ICD-10-CM

## 2025-09-02 DIAGNOSIS — M79.672 LEFT FOOT PAIN: ICD-10-CM

## 2025-09-02 DIAGNOSIS — K21.9 GASTROESOPHAGEAL REFLUX DISEASE, UNSPECIFIED WHETHER ESOPHAGITIS PRESENT: ICD-10-CM

## 2025-09-02 DIAGNOSIS — E78.5 HYPERLIPIDEMIA, UNSPECIFIED HYPERLIPIDEMIA TYPE: ICD-10-CM

## 2025-09-02 PROBLEM — K29.00 ACUTE SUPERFICIAL GASTRITIS WITHOUT HEMORRHAGE: Status: RESOLVED | Noted: 2021-12-01 | Resolved: 2025-09-02

## 2025-09-02 PROBLEM — K92.2 GI BLEED: Status: RESOLVED | Noted: 2021-12-01 | Resolved: 2025-09-02

## 2025-09-02 PROBLEM — R79.1 ELEVATED INR: Status: RESOLVED | Noted: 2021-11-22 | Resolved: 2025-09-02

## 2025-09-02 PROBLEM — E87.6 HYPOKALEMIA: Status: RESOLVED | Noted: 2021-11-22 | Resolved: 2025-09-02

## 2025-09-02 PROBLEM — K62.5 RECTAL BLEEDING: Status: RESOLVED | Noted: 2021-11-29 | Resolved: 2025-09-02

## 2025-09-02 PROBLEM — K57.30 DIVERTICULA OF COLON: Status: RESOLVED | Noted: 2021-05-17 | Resolved: 2025-09-02

## 2025-09-02 PROBLEM — Z12.11 SCREENING FOR MALIGNANT NEOPLASM OF COLON: Status: RESOLVED | Noted: 2021-05-17 | Resolved: 2025-09-02

## 2025-09-02 PROBLEM — K21.00 GASTRO-ESOPHAGEAL REFLUX DISEASE WITH ESOPHAGITIS: Status: RESOLVED | Noted: 2017-08-17 | Resolved: 2025-09-02

## 2025-09-02 PROBLEM — M65.311 TRIGGER FINGER OF RIGHT THUMB: Status: RESOLVED | Noted: 2022-09-07 | Resolved: 2025-09-02

## 2025-09-02 PROCEDURE — 73630 X-RAY EXAM OF FOOT: CPT | Mod: TC,RHCUB,LT | Performed by: INTERNAL MEDICINE

## 2025-09-02 PROCEDURE — 99214 OFFICE O/P EST MOD 30 MIN: CPT | Mod: ,,, | Performed by: INTERNAL MEDICINE

## 2025-09-02 PROCEDURE — 73630 X-RAY EXAM OF FOOT: CPT | Mod: 26,LT,, | Performed by: RADIOLOGY
